# Patient Record
Sex: FEMALE | Race: WHITE | Employment: OTHER | URBAN - METROPOLITAN AREA
[De-identification: names, ages, dates, MRNs, and addresses within clinical notes are randomized per-mention and may not be internally consistent; named-entity substitution may affect disease eponyms.]

---

## 2020-05-13 ENCOUNTER — VIRTUAL VISIT (OUTPATIENT)
Dept: HEMATOLOGY | Age: 60
End: 2020-05-13

## 2020-05-13 DIAGNOSIS — B18.2 CHRONIC HEPATITIS C WITHOUT HEPATIC COMA (HCC): Primary | ICD-10-CM

## 2020-05-13 PROBLEM — C85.90 LYMPHOMA (HCC): Status: ACTIVE | Noted: 2020-05-13

## 2020-05-13 PROBLEM — J45.909 ASTHMA: Status: ACTIVE | Noted: 2020-05-13

## 2020-05-13 PROBLEM — E89.0 H/O THYROIDECTOMY: Status: ACTIVE | Noted: 2020-05-13

## 2020-05-13 PROBLEM — Z90.710 S/P TAH (TOTAL ABDOMINAL HYSTERECTOMY): Status: ACTIVE | Noted: 2020-05-13

## 2020-05-13 PROBLEM — Z90.49 HISTORY OF APPENDECTOMY: Status: ACTIVE | Noted: 2020-05-13

## 2020-05-13 PROBLEM — E03.9 HYPOTHYROIDISM: Status: ACTIVE | Noted: 2020-05-13

## 2020-05-13 PROBLEM — G35 MULTIPLE SCLEROSIS (HCC): Status: ACTIVE | Noted: 2020-05-13

## 2020-05-13 PROBLEM — I10 HYPERTENSION: Status: ACTIVE | Noted: 2020-05-13

## 2020-05-13 PROBLEM — E05.90 HYPERTHYROIDISM: Status: ACTIVE | Noted: 2020-05-13

## 2020-05-13 PROBLEM — E78.00 HYPERCHOLESTEROLEMIA: Status: ACTIVE | Noted: 2020-05-13

## 2020-05-13 NOTE — PROGRESS NOTES
[address] [place] [address], Bon EDWARD, [address] , RAMSES Waters ACNP-BC     April S Ludy [facility]   Ruthanna Opitz, FNP-C [place] Late, [facility]       Александр P[address] 136    at [address], [address] [place] [address], [facility] 22.    [phone]    FAX: [address] [place] [address]    at [address], [address]    [phone]    FAX: [phone]       Patient Care Team:  Other, MD Barron as PCP - Farhat Anthony MD (Hematology and Oncology)  AZIZA Petersen      Problem List  Date Reviewed: 5/13/2020          Codes Class Noted    Chronic hepatitis C ([facility] ICD-10-CM: B18.2  ICD-9-CM: 070.54  5/13/2020        Lymphoma ([facility] ICD-10-CM: C85.90  ICD-9-CM: 202.80  5/13/2020        Asthma ICD-10-CM: J45.909  ICD-9-CM: 493.90  5/13/2020        Hyperthyroidism ICD-10-CM: E05.90  ICD-9-CM: 242.90  5/13/2020        H/O thyroidectomy ICD-10-CM: Z90.09  ICD-9-CM: V15.29  5/13/2020        Hypothyroidism ICD-10-CM: E03.9  ICD-9-CM: 244.9  5/13/2020        Hypertension ICD-10-CM: I10  ICD-9-CM: 401.9  5/13/2020        Hypercholesterolemia ICD-10-CM: E78.00  ICD-9-CM: 272.0  5/13/2020        History of appendectomy ICD-10-CM: Z90.49  ICD-9-CM: V45.89  5/13/2020        S/P SHAY (total abdominal hysterectomy) ICD-10-CM: Z90.710  ICD-9-CM: V88.01  5/13/2020        Multiple sclerosis ([facility] ICD-10-CM: G35  ICD-9-CM: 799  5/13/2020              VIRTUAL TELEHEALTH VISIT PERFORMED DUE TO COVID-19 EPIDEMIC    CONSENT:  Varghese Malloy, who was seen by synchronous, real-time, audio-video technology, and/or her healthcare decision maker, is aware that this patient-initiated, Telehealth encounter on 5/13/2020 is a billable service, with coverage as determined by her insurance carrier. She is aware that she may receive a bill and has provided verbal consent to proceed. This patient was evaluated during a Virtual Telehealth visit. A caregiver was present if appropriate. Due to this being a TeleHealth encounter performed during the XQCGQ-49 public health emergency, the physical examination was limited to that listed in the [address] [facility].    The clinicians listed above have asked me to see Isa Dukes in consultation regarding chronic HCV and its management. All medical records sent by the referring physicians were reviewed including imaging studies     The patient is a 64 y.o.  female who was screened for anti-HCV and tested positive during blood donation in 1998. Risk factors for acquiring HCV are blood transfusions in 1985. There was no history of acute icteric hepatitis at the time of these risk factors. CT scan of the liver was performed in 4/2020. The results suggest the liver is normal with a 2.4 cm liver cyst.      An assessment of liver fibrosis with biopsy or elastography has not been performed. The patient was treated with peginterferon and ribavirin in the early 2000s. The patient tolerated treatment poorly and had to prematurely discontinue therapy due to neutropenia  HCV RNA levels obtained during treatment are not available at this time. The patient has the following symptoms which are thought to be due to the liver disease:  fatigue, nausea,     The patient is not currently experiencing the following symptoms of liver disease:  pain in the right side over the liver,     The patient has limitations in functional activities which can be attributed to the liver disease and to other medical problems that are not related to the liver disease. ASSESSMENT AND PLAN:  Chronic HCV  Chronic HCV is of unclear severity. Liver transaminases are elevated.   ALP is normal.  Liver function is normal.  The platelet count is normal.      Will perform laboratory testing to monitor liver function and degree of liver injury. This included BMP, hepatic panel, CBC with platelet count,     Will perform and/or review results of HCV viral load, HCV genotype to define the specific treatment and duration of treatment that will be required. Will perform serologic and virologic studies to assess for other causes of chronic liver disease. The degree of fibrosis will be assessed by Fibrosure. Chronic HCV Treatment  The patient was previously treated for HCV with peginterferon and ribavirin   The previous treatment response was a non-response. The patient has HCV genotype that is not yet defined. Discussed the treatment alternatives. The SVR/cure rate for HCV now exceeds 97% without significant side effects for most patients with HCV. The specific treatment is dependent upon genotype, viral load and histology. Screening for Hepatocellular Carcinoma  HCC screening is not necessary if the patient has no evidence of cirrhosis. Treatment of other medical problems in patients with chronic liver disease  There are no contraindications for the patient to take most medications that are necessary for treatment of other medical issues. Counseling for alcohol in patients with chronic liver disease  The patient was counseled regarding alcohol consumption and the effect of alcohol on chronic liver disease. The patient does not consume any significant amount of alcohol. Vaccinations   Vaccination for viral hepatitis A and B is not needed. The patient has serologic evidence of prior exposure or vaccination with immunity. Routine vaccinations against other bacterial and viral agents can be performed as indicated. Annual flu vaccination should be administered if indicated.       ALLERGIES  Allergies   Allergen Reactions    Acetaminophen Other (comments)     Hep c         MEDICATIONS  Current Outpatient Medications   Medication Sig    ondansetron hcl (ZOFRAN, AS HYDROCHLORIDE,) 4 mg tablet Take 1 Tab by mouth every eight (8) hours as needed for Nausea.  oxyCODONE IR (ROXICODONE) 5 mg immediate release tablet Take 1 Tab by mouth every four (4) hours as needed for Pain. Max Daily Amount: 30 mg. Please do not drive while taking this medication. No current facility-administered medications for this visit. SYSTEM REVIEW NOT RELATED TO LIVER DISEASE OR REVIEWED ABOVE:  Constitution systems: Negative for fever, chills, weight gain, weight loss. Eyes: Negative for visual changes. ENT: Negative for sore throat, painful swallowing. Respiratory: Negative for cough, hemoptysis, SOB. Cardiology: Negative for chest pain, palpitations. GI:  Negative for constipation or diarrhea. : Negative for urinary frequency, dysuria, hematuria, nocturia. Skin: Negative for rash. Hematology: Negative for easy bruising, blood clots. Musculo-skelatal: Negative for back pain, muscle pain, weakness. Neurologic: Negative for headaches, dizziness, vertigo, memory problems not related to HE. Psychology: Negative for anxiety, depression. FAMILY HISTORY:  The father  of lung cancer. The mother  of heart diseasse. There is no family history of liver disease. SOCIAL HISTORY:  The patient is . The patient has 2 children, and 7 grandchildren. The patient currently smokes 4-5 cigarettes daily. The patient has never consumed significant amounts of alcohol. 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 Eyes: Sclera anicteric. ENT: No oral lesions. Skin: No rashes. spider angiomata. No jaundice. Abdomen: No obvious distention suggesting ascites. Extremities: No edema. No muscle wasting. Neurologic: Alert and oriented.   Cranial nerves grossly intact. LABORATORY STUDIES:  From 1/2020  AST/ALT/ALP/T Bili/ALB:  55/63/110/0.3/4.6  WBC/HB/PLT/INR:  5.8/14.0/236  NA/BUN/CREAT:  15/1.3    SEROLOGIES:  1/2020. HAV total positive, anti-HBcore negative, anti-HBsurface positive, anti-HCV positive,     LIVER HISTOLOGY:  Not available or performed    ENDOSCOPIC PROCEDURES:  Not available or performed    RADIOLOGY:  4/2020. CT scan abdomen with IV contrast.  Normal appearing liver. No liver mass lesions. 2.4 cm cyst in left lobe. Normal spleen. No ascites. OTHER TESTING:  Not available or performed    FOLLOW-UP:  Pursuant to the emergency declaration under the [address] [place] [address], [facility] waiver authority and the [employer] Act, this Virtual  Visit was conducted, with the patient's (and/or their legal guardian's) consent, to reduce the patient's risk of exposure to COVID-19 and provide necessary medical care. Services were provided through a video synchronous discussion virtually to substitute for an in-person clinic visit. The patient was located in their home. The provider was located in the [place] [address] office. Because of the COVID-19 epidemic a follow-up appointment will be performed via TeleHealth in 4 weeks to review all data and initiate HCV treatment. Orders to obtain laboratory testing will be mailed to the patient and obtained 1 week prior to the next TeleHealth encounter.       Chase Pena MD  [address] [address], [address] [place] [address] - [address]  [address] [place] [address]

## 2020-05-28 ENCOUNTER — HOSPITAL ENCOUNTER (OUTPATIENT)
Dept: LAB | Age: 60
Discharge: HOME | End: 2020-05-28
Payer: MEDICAID

## 2020-05-28 DIAGNOSIS — B18.2 CHRONIC HEPATITIS C WITHOUT HEPATIC COMA (HCC): ICD-10-CM

## 2020-05-28 LAB
ALBUMIN SERPL-MCNC: 3.2 G/DL (ref 3.4–5)
ALBUMIN/GLOB SERPL: 1 {RATIO} (ref 0.8–1.7)
ALP SERPL-CCNC: 129 U/L (ref 45–117)
ALT SERPL-CCNC: 55 U/L (ref 13–56)
ANION GAP SERPL CALC-SCNC: 7 MMOL/L (ref 3–18)
AST SERPL-CCNC: 56 U/L (ref 10–38)
BASOPHILS # BLD: 0.1 K/UL (ref 0–0.1)
BASOPHILS NFR BLD: 2 % (ref 0–2)
BILIRUB DIRECT SERPL-MCNC: 0.2 MG/DL (ref 0–0.2)
BILIRUB SERPL-MCNC: 0.4 MG/DL (ref 0.2–1)
BUN SERPL-MCNC: 16 MG/DL (ref 7–18)
BUN/CREAT SERPL: 19 (ref 12–20)
CALCIUM SERPL-MCNC: 9.1 MG/DL (ref 8.5–10.1)
CHLORIDE SERPL-SCNC: 106 MMOL/L (ref 100–111)
CO2 SERPL-SCNC: 26 MMOL/L (ref 21–32)
CREAT SERPL-MCNC: 0.85 MG/DL (ref 0.6–1.3)
DIFFERENTIAL METHOD BLD: ABNORMAL
EOSINOPHIL # BLD: 0.2 K/UL (ref 0–0.4)
EOSINOPHIL NFR BLD: 4 % (ref 0–5)
ERYTHROCYTE [DISTWIDTH] IN BLOOD BY AUTOMATED COUNT: 16.2 % (ref 11.6–14.5)
GLOBULIN SER CALC-MCNC: 3.3 G/DL (ref 2–4)
GLUCOSE SERPL-MCNC: 108 MG/DL (ref 74–99)
HCT VFR BLD AUTO: 43 % (ref 35–45)
HGB BLD-MCNC: 13.3 G/DL (ref 12–16)
LYMPHOCYTES # BLD: 2 K/UL (ref 0.9–3.6)
LYMPHOCYTES NFR BLD: 30 % (ref 21–52)
MCH RBC QN AUTO: 24.4 PG (ref 24–34)
MCHC RBC AUTO-ENTMCNC: 30.9 G/DL (ref 31–37)
MCV RBC AUTO: 78.9 FL (ref 74–97)
MONOCYTES # BLD: 0.6 K/UL (ref 0.05–1.2)
MONOCYTES NFR BLD: 9 % (ref 3–10)
NEUTS SEG # BLD: 3.6 K/UL (ref 1.8–8)
NEUTS SEG NFR BLD: 55 % (ref 40–73)
PLATELET # BLD AUTO: 283 K/UL (ref 135–420)
PMV BLD AUTO: 9.6 FL (ref 9.2–11.8)
POTASSIUM SERPL-SCNC: 4.4 MMOL/L (ref 3.5–5.5)
PROT SERPL-MCNC: 6.5 G/DL (ref 6.4–8.2)
RBC # BLD AUTO: 5.45 M/UL (ref 4.2–5.3)
SODIUM SERPL-SCNC: 139 MMOL/L (ref 136–145)
WBC # BLD AUTO: 6.5 K/UL (ref 4.6–13.2)

## 2020-05-28 PROCEDURE — 87340 HEPATITIS B SURFACE AG IA: CPT

## 2020-05-28 PROCEDURE — 86706 HEP B SURFACE ANTIBODY: CPT

## 2020-05-28 PROCEDURE — 86708 HEPATITIS A ANTIBODY: CPT

## 2020-05-28 PROCEDURE — 87389 HIV-1 AG W/HIV-1&-2 AB AG IA: CPT

## 2020-05-28 PROCEDURE — 80076 HEPATIC FUNCTION PANEL: CPT

## 2020-05-28 PROCEDURE — 86704 HEP B CORE ANTIBODY TOTAL: CPT

## 2020-05-28 PROCEDURE — 87521 HEPATITIS C PROBE&RVRS TRNSC: CPT

## 2020-05-28 PROCEDURE — 36415 COLL VENOUS BLD VENIPUNCTURE: CPT

## 2020-05-28 PROCEDURE — 85025 COMPLETE CBC W/AUTO DIFF WBC: CPT

## 2020-05-28 PROCEDURE — 81596 NFCT DS CHRNC HCV 6 ASSAYS: CPT

## 2020-05-28 PROCEDURE — 87902 NFCT AGT GNTYP ALYS HEP C: CPT

## 2020-05-28 PROCEDURE — 80048 BASIC METABOLIC PNL TOTAL CA: CPT

## 2020-05-29 LAB
HAV AB SER QL IA: POSITIVE
HBV CORE AB SERPL QL IA: NEGATIVE
HBV SURFACE AB SER QL IA: POSITIVE
HBV SURFACE AB SERPL IA-ACNC: 98.42 MIU/ML
HBV SURFACE AG SER QL: <0.1 INDEX
HBV SURFACE AG SER QL: NEGATIVE
HCV RNA SERPL NAA+PROBE-LOG IU: 6.85 HCV LOG 10IU/ML
HCV RNA SERPL PROBE AMP-ACNC: ABNORMAL HCVIU/ML
HCV RNA SERPL QL NAA+PROBE: POSITIVE
HEP BS AB COMMENT,HBSAC: NORMAL
HIV 1+2 AB+HIV1 P24 AG SERPL QL IA: NONREACTIVE
HIV12 RESULT COMMENT, HHIVC: NORMAL

## 2020-05-30 LAB
A2 MACROGLOB SERPL-MCNC: 327 MG/DL (ref 110–276)
ALT (SGPT) P5P, 001547: 49 IU/L (ref 0–40)
APO A-I SERPL-MCNC: 106 MG/DL (ref 116–209)
BILIRUB SERPL-MCNC: 0.3 MG/DL (ref 0–1.2)
COMMENT, 550127: ABNORMAL
FIBROSIS SCORE, 550102, HCVF1: 0.45 (ref 0–0.21)
FIBROSIS SCORING:, 550107: ABNORMAL
FIBROSIS STAGE, 550132: ABNORMAL
GGT SERPL-CCNC: 55 IU/L (ref 0–60)
HAPTOGLOB SERPL-MCNC: 181 MG/DL (ref 33–346)
INTERPRETATION, 550106: ABNORMAL
LIMITATIONS, 550105: ABNORMAL
NECROINFLAMM ACTIVITY SCORING:, 550121: ABNORMAL
NECROINFLAMMAT ACTIVITY GRADE, 550133: ABNORMAL
NECROINFLAMMAT ACTIVITY SCORE, 550103: 0.33 (ref 0–0.17)

## 2020-06-03 LAB
HCV GENTYP SERPL NAA+PROBE: NORMAL
PLEASE NOTE, 550474: NORMAL

## 2020-08-05 ENCOUNTER — VIRTUAL VISIT (OUTPATIENT)
Dept: HEMATOLOGY | Age: 60
End: 2020-08-05

## 2020-08-05 DIAGNOSIS — B18.2 CHRONIC HEPATITIS C WITHOUT HEPATIC COMA (HCC): ICD-10-CM

## 2020-08-05 PROBLEM — C85.88 MARGINAL ZONE LYMPHOMA OF LYMPH NODES OF MULTIPLE SITES (HCC): Status: ACTIVE | Noted: 2020-05-13

## 2020-08-05 RX ORDER — PROMETHAZINE HYDROCHLORIDE 25 MG/1
25 TABLET ORAL
COMMUNITY

## 2020-08-05 RX ORDER — HYDROXYZINE 50 MG/1
50 TABLET, FILM COATED ORAL
COMMUNITY

## 2020-08-05 RX ORDER — VELPATASVIR AND SOFOSBUVIR 100; 400 MG/1; MG/1
1 TABLET, FILM COATED ORAL DAILY
Qty: 28 TAB | Refills: 2 | Status: SHIPPED | OUTPATIENT
Start: 2020-08-05

## 2020-08-05 RX ORDER — LORAZEPAM 0.5 MG/1
TABLET ORAL
COMMUNITY

## 2020-08-05 RX ORDER — PREGABALIN 75 MG/1
CAPSULE ORAL
COMMUNITY

## 2020-08-05 RX ORDER — ZOLPIDEM TARTRATE 5 MG/1
TABLET ORAL
COMMUNITY

## 2020-08-05 RX ORDER — LEVOTHYROXINE SODIUM 112 UG/1
TABLET ORAL
COMMUNITY

## 2020-08-05 RX ORDER — OLANZAPINE 2.5 MG/1
TABLET ORAL
COMMUNITY

## 2020-08-05 NOTE — PROGRESS NOTES
[address] [place] [address], Rashmi EDWARD Matilde Ranger, MD Darron Bayley, PA-C Elvie Packer, ACNP-BC     Heather Boston, [facility]   Satish Madrigal FNP-ASHUTOSH Esquivel, [facility]       Александр Deputado Trever [address]    at [address], [address] [place] [address], [facility] 22.    [phone]    FAX: [address] [place] [address]    at [address], [address], [address]    [phone]    FAX: [phone]       Patient Care Team:  Fito Velasquez MD as PCP - General (Family Medicine)  Annita Diaz MD (Hematology and Oncology)  [facility]      Problem List  Date Reviewed: 8/5/2020          Codes Class Noted    Chronic hepatitis C (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) ICD-10-CM: C85.88  ICD-9-CM: 200.38  5/13/2020        Asthma ICD-10-CM: J45.909  ICD-9-CM: 493.90  5/13/2020        Hyperthyroidism ICD-10-CM: E05.90  ICD-9-CM: 242.90  5/13/2020        H/O thyroidectomy ICD-10-CM: Z90.09  ICD-9-CM: V15.29  5/13/2020        Hypothyroidism ICD-10-CM: E03.9  ICD-9-CM: 244.9  5/13/2020        Hypertension ICD-10-CM: I10  ICD-9-CM: 401.9  5/13/2020        Hypercholesterolemia ICD-10-CM: E78.00  ICD-9-CM: 272.0  5/13/2020        History of appendectomy ICD-10-CM: Z90.49  ICD-9-CM: V45.89  5/13/2020        S/P SHAY (total abdominal hysterectomy) ICD-10-CM: Z90.710  ICD-9-CM: V88.01  5/13/2020        Multiple sclerosis ([facility] ICD-10-CM: G35  ICD-9-CM: 960  5/13/2020              VIRTUAL TELEHEALTH VISIT PERFORMED DUE TO COVID-19 EPIDEMIC    CONSENT:  Teodoro Mcintosh, who was seen by synchronous, real-time, audio-video technology, and/or her healthcare decision maker, is aware that this patient-initiated, Telehealth encounter on 8/5/2020 is a billable service, with coverage as determined by her insurance carrier. She is aware that she may receive a bill and has provided verbal consent to proceed. This patient was evaluated during a Virtual Telehealth visit. A caregiver was present if appropriate. Due to this being a TeleHealth encounter performed during the VTWPK-96 public health emergency, the physical examination was limited to that listed in the Katmiguel ajory Jarrett is being seen at [facility] for management of chronic HCV. The active problem list, all pertinent past medical history, medications, liver histology, radiologic findings, and laboratory findings related to the liver disorder were reviewed with the patient. The patient is a 64 y.o.  female who was screened for anti-HCV and tested positive during blood donation in 1998. Risk factors for acquiring HCV are blood transfusions in 1985. There was no history of acute icteric hepatitis at the time of these risk factors. CT scan of the liver was performed in 4/2020. The results suggest the liver is normal with a 2.4 cm liver cyst.      An assessment of liver fibrosis with biopsy or elastography has not been performed. The patient was treated with peginterferon and ribavirin in the early 2000s. The patient tolerated treatment poorly and had to prematurely discontinue therapy due to neutropenia  HCV RNA levels obtained during treatment are not available at this time.       The patient has the following symptoms which are thought to be due to the liver disease:  fatigue, nausea    The patient is not currently experiencing the following symptoms of liver disease:  pain in the right side over the liver    The patient has limitations in functional activities which can be attributed to the liver disease and to other medical problems that are not related to the liver disease. ASSESSMENT AND PLAN:  Chronic HCV  Chronic HCV with F1-F2 per Fibrosure. AST is elevated. ALT is normal. ALP is elevated. Liver function is normal. The platelet count is normal.      Have performed laboratory testing to monitor liver function and degree of liver injury. This included BMP, hepatic panel, CBC with platelet count,     Have reviewed results of HCV viral load, HCV genotype to define the specific treatment and duration of treatment that will be required. Serologic and virologic studies to assess for other causes of chronic liver disease were negative. Fibrosure results of 0.45 suggest F1-F2. Chronic HCV Treatment  The patient was previously treated for HCV with peginterferon and ribavirin   The previous treatment response was a non-response. The patient has HCV genotype 1A    Discussed the treatment alternatives. The SVR/cure rate for HCV now exceeds 97% without significant side effects for most patients with HCV. The specific treatment is dependent upon genotype, viral load and histology. The patient should be treated with Mavyret (glecaprevir and piprentasvir) or Epclusa (sofosbuvir and valpitasvir). The SVR/cure rate for is over 95%. We will request pre-authorization for the HCV medication subject to the approval guidelines of the patient's insurance carrier. If the patient is denied we may appeal on their behalf. I have explained to her that I will order the medications from the specialty pharmacy and they will be delivered to her home. She may begin taking the treatment medications as soon as they arrive. She is to call and make an appointment for treatment week #4 once she begins therapy. She voiced understanding of this plan. Screening for Hepatocellular Carcinoma  HCC screening is not necessary if the patient has no evidence of cirrhosis.     Treatment of other medical problems in patients with chronic liver disease  There are no contraindications for the patient to take most medications that are necessary for treatment of other medical issues. Counseling for alcohol in patients with chronic liver disease  The patient was counseled regarding alcohol consumption and the effect of alcohol on chronic liver disease. The patient does not consume any significant amount of alcohol. Vaccinations   Vaccination for viral hepatitis A and B is not needed. The patient has serologic evidence of prior exposure or vaccination with immunity. Routine vaccinations against other bacterial and viral agents can be performed as indicated. Annual flu vaccination should be administered if indicated. ALLERGIES  Allergies   Allergen Reactions    Acetaminophen Other (comments)     Hep c         MEDICATIONS  Current Outpatient Medications   Medication Sig    promethazine (PHENERGAN) 25 mg tablet Take 25 mg by mouth every six (6) hours as needed.  levothyroxine (SYNTHROID) 112 mcg tablet Take  by mouth Daily (before breakfast).  OLANZapine (ZyPREXA) 2.5 mg tablet Take  by mouth nightly.  hydrOXYzine HCL (ATARAX) 50 mg tablet Take 50 mg by mouth three (3) times daily as needed.  pregabalin (LYRICA) 75 mg capsule Take  by mouth.  LORazepam (ATIVAN) 0.5 mg tablet Take  by mouth.  zolpidem (AMBIEN) 5 mg tablet Take  by mouth nightly as needed.  riTUXimab in 0.9% sodium chloride solution 375 mg/m2 by IntraVENous route.  ondansetron hcl (ZOFRAN, AS HYDROCHLORIDE,) 4 mg tablet Take 1 Tab by mouth every eight (8) hours as needed for Nausea. No current facility-administered medications for this visit. SYSTEM REVIEW NOT RELATED TO LIVER DISEASE OR REVIEWED ABOVE:  Constitution systems: Negative for fever, chills, weight gain, weight loss. Eyes: Negative for visual changes. ENT: Negative for sore throat, painful swallowing. Respiratory: Negative for cough, hemoptysis, SOB.    Cardiology: Negative for chest pain, palpitations. GI:  Negative for constipation or diarrhea. : Negative for urinary frequency, dysuria, hematuria, nocturia. Skin: Negative for rash. Hematology: Negative for easy bruising, blood clots. Musculo-skelatal: Negative for back pain, muscle pain, weakness. Neurologic: Negative for headaches, dizziness, vertigo, memory problems not related to HE. Psychology: Negative for anxiety, depression. FAMILY HISTORY:  The father  of lung cancer. The mother  of heart diseasse. There is no family history of liver disease. SOCIAL HISTORY:  The patient is . The patient has 2 children, and 7 grandchildren. The patient currently smokes 4-5 cigarettes daily. The patient has never consumed significant amounts of alcohol. 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 Eyes: Sclera anicteric. ENT: No oral lesions. Skin: No rashes. spider angiomata. No jaundice. Abdomen: No obvious distention suggesting ascites. Extremities: No edema. No muscle wasting. Neurologic: Alert and oriented. Cranial nerves grossly intact. LABORATORY STUDIES:  Liver [place] of [address] Units 2020   WBC 4.6 - 13.2 K/uL 6.5   ANC 1.8 - 8.0 K/UL 3.6   HGB 12.0 - 16.0 g/dL 13.3    - 420 K/uL 283   AST 10 - 38 U/L 56 (H)   ALT 0 - 40 IU/L 55   Alk Phos 45 - 117 U/L 129 (H)   Bili, Total 0.0 - 1.2 mg/dL 0.4   Bili, Direct 0.0 - 0.2 MG/DL 0.2   Albumin 3.4 - 5.0 g/dL 3.2 (L)   BUN 7.0 - 18 MG/DL 16   Creat 0.6 - 1.3 MG/DL 0.85   Na 136 - 145 mmol/L 139   K 3.5 - 5.5 mmol/L 4.4   Cl 100 - 111 mmol/L 106   CO2 21 - 32 mmol/L 26   Glucose 74 - 99 mg/dL 108 (H)       SEROLOGIES:  2020.   HAV total positive, anti-HBcore negative, anti-HBsurface positive, anti-HCV positive    Serologies Latest Ref Rng & Units 2020   Hep A Ab, Total Negative   Positive (A)   Hep B Surface Ag <1.00 Index <0.10   Hep B Surface Ag Interp NEG   Negative   Hep B Core Ab, Total Negative   Negative   Hep B Surface Ab >10.0 mIU/mL 98.42   Hep B Surface Ab Interp POS   Positive   Hep C Genotype  1a     HCV RNA:  5/2020. 7,002,091 IU/mL    LIVER HISTOLOGY:  5/2020. Fibrosure. ENDOSCOPIC PROCEDURES:  Not available or performed    RADIOLOGY:  4/2020. CT scan abdomen with IV contrast.  Normal appearing liver. No liver mass lesions. 2.4 cm cyst in left lobe. Normal spleen. No ascites. OTHER TESTING:  Not available or performed    FOLLOW-UP:  All of the issues listed above in the Assessment and Plan were discussed with the patient. All questions were answered. The patient expressed a clear understanding of the above.     Return to [place] [address] for monitoring of HCV treatment in 4 weeks after starting medication      Hailey Martin, AGPCNP-BC  [address] of [address], [address]  [phone]

## 2022-03-18 PROBLEM — E78.00 HYPERCHOLESTEROLEMIA: Status: ACTIVE | Noted: 2020-05-13

## 2022-03-18 PROBLEM — E05.90 HYPERTHYROIDISM: Status: ACTIVE | Noted: 2020-05-13

## 2022-03-18 PROBLEM — C85.88 MARGINAL ZONE LYMPHOMA OF LYMPH NODES OF MULTIPLE SITES (HCC): Status: ACTIVE | Noted: 2020-05-13

## 2022-03-18 PROBLEM — E03.9 HYPOTHYROIDISM: Status: ACTIVE | Noted: 2020-05-13

## 2022-03-18 PROBLEM — I10 HYPERTENSION: Status: ACTIVE | Noted: 2020-05-13

## 2022-03-19 PROBLEM — Z90.710 S/P TAH (TOTAL ABDOMINAL HYSTERECTOMY): Status: ACTIVE | Noted: 2020-05-13

## 2022-03-19 PROBLEM — B18.2 CHRONIC HEPATITIS C (HCC): Status: ACTIVE | Noted: 2020-05-13

## 2022-03-19 PROBLEM — E89.0 H/O THYROIDECTOMY: Status: ACTIVE | Noted: 2020-05-13

## 2022-03-19 PROBLEM — J45.909 ASTHMA: Status: ACTIVE | Noted: 2020-05-13

## 2022-03-19 PROBLEM — G35 MULTIPLE SCLEROSIS (HCC): Status: ACTIVE | Noted: 2020-05-13

## 2022-03-19 PROBLEM — Z90.49 HISTORY OF APPENDECTOMY: Status: ACTIVE | Noted: 2020-05-13

## 2024-05-18 ENCOUNTER — HOSPITAL ENCOUNTER (INPATIENT)
Facility: HOSPITAL | Age: 64
LOS: 2 days | Discharge: HOME | DRG: 383 | End: 2024-05-24
Attending: INTERNAL MEDICINE | Admitting: INTERNAL MEDICINE
Payer: MEDICAID

## 2024-05-18 ENCOUNTER — APPOINTMENT (OUTPATIENT)
Facility: HOSPITAL | Age: 64
DRG: 383 | End: 2024-05-18
Payer: MEDICAID

## 2024-05-18 DIAGNOSIS — Z87.09 HISTORY OF COPD: ICD-10-CM

## 2024-05-18 DIAGNOSIS — I24.9 ACUTE CORONARY SYNDROME (HCC): ICD-10-CM

## 2024-05-18 DIAGNOSIS — R07.9 CHEST PAIN, UNSPECIFIED TYPE: Primary | ICD-10-CM

## 2024-05-18 DIAGNOSIS — I20.9 ANGINA PECTORIS (HCC): ICD-10-CM

## 2024-05-18 DIAGNOSIS — Z99.81 O2 DEPENDENT: ICD-10-CM

## 2024-05-18 DIAGNOSIS — R07.9 CHEST PAIN: ICD-10-CM

## 2024-05-18 PROBLEM — J96.11 CHRONIC HYPOXIC RESPIRATORY FAILURE (HCC): Status: ACTIVE | Noted: 2024-05-18

## 2024-05-18 PROBLEM — R07.89 ATYPICAL CHEST PAIN: Status: ACTIVE | Noted: 2024-05-18

## 2024-05-18 PROBLEM — F41.9 ANXIETY: Status: ACTIVE | Noted: 2024-05-18

## 2024-05-18 PROBLEM — E11.65 INADEQUATELY CONTROLLED DIABETES MELLITUS (HCC): Status: ACTIVE | Noted: 2024-05-18

## 2024-05-18 PROBLEM — J43.2 CENTRILOBULAR EMPHYSEMA (HCC): Status: ACTIVE | Noted: 2024-05-18

## 2024-05-18 LAB
ALBUMIN SERPL-MCNC: 3.4 G/DL (ref 3.4–5)
ALBUMIN/GLOB SERPL: 1.2 (ref 0.8–1.7)
ALP SERPL-CCNC: 144 U/L (ref 45–117)
ALT SERPL-CCNC: 55 U/L (ref 13–56)
ANION GAP SERPL CALC-SCNC: 7 MMOL/L (ref 3–18)
APPEARANCE UR: CLEAR
AST SERPL-CCNC: 68 U/L (ref 10–38)
BASOPHILS # BLD: 0.1 K/UL (ref 0–0.1)
BASOPHILS NFR BLD: 2 % (ref 0–2)
BILIRUB SERPL-MCNC: 0.4 MG/DL (ref 0.2–1)
BILIRUB UR QL: NEGATIVE
BUN SERPL-MCNC: 6 MG/DL (ref 7–18)
BUN/CREAT SERPL: 7 (ref 12–20)
CALCIUM SERPL-MCNC: 9 MG/DL (ref 8.5–10.1)
CHLORIDE SERPL-SCNC: 109 MMOL/L (ref 100–111)
CO2 SERPL-SCNC: 25 MMOL/L (ref 21–32)
COLOR UR: YELLOW
CREAT SERPL-MCNC: 0.84 MG/DL (ref 0.6–1.3)
DIFFERENTIAL METHOD BLD: ABNORMAL
EOSINOPHIL # BLD: 0.2 K/UL (ref 0–0.4)
EOSINOPHIL NFR BLD: 3 % (ref 0–5)
ERYTHROCYTE [DISTWIDTH] IN BLOOD BY AUTOMATED COUNT: 18.6 % (ref 11.6–14.5)
EST. AVERAGE GLUCOSE BLD GHB EST-MCNC: 146 MG/DL
FLUAV RNA SPEC QL NAA+PROBE: NOT DETECTED
FLUBV RNA SPEC QL NAA+PROBE: NOT DETECTED
GLOBULIN SER CALC-MCNC: 2.9 G/DL (ref 2–4)
GLUCOSE BLD STRIP.AUTO-MCNC: 175 MG/DL (ref 74–99)
GLUCOSE BLD STRIP.AUTO-MCNC: 235 MG/DL (ref 70–110)
GLUCOSE SERPL-MCNC: 179 MG/DL (ref 74–99)
GLUCOSE UR STRIP.AUTO-MCNC: NEGATIVE MG/DL
HBA1C MFR BLD: 6.7 % (ref 4.2–5.6)
HCT VFR BLD AUTO: 36.6 % (ref 35–45)
HGB BLD-MCNC: 11.2 G/DL (ref 12–16)
HGB UR QL STRIP: NEGATIVE
IMM GRANULOCYTES # BLD AUTO: 0.1 K/UL (ref 0–0.04)
IMM GRANULOCYTES NFR BLD AUTO: 1 % (ref 0–0.5)
KETONES UR QL STRIP.AUTO: NEGATIVE MG/DL
LACTATE BLD-SCNC: 1.14 MMOL/L (ref 0.4–2)
LEUKOCYTE ESTERASE UR QL STRIP.AUTO: NEGATIVE
LIPASE SERPL-CCNC: 39 U/L (ref 13–75)
LYMPHOCYTES # BLD: 2.4 K/UL (ref 0.9–3.6)
LYMPHOCYTES NFR BLD: 33 % (ref 21–52)
MAGNESIUM SERPL-MCNC: 2.2 MG/DL (ref 1.6–2.6)
MCH RBC QN AUTO: 21.3 PG (ref 24–34)
MCHC RBC AUTO-ENTMCNC: 30.6 G/DL (ref 31–37)
MCV RBC AUTO: 69.6 FL (ref 78–100)
MONOCYTES # BLD: 0.6 K/UL (ref 0.05–1.2)
MONOCYTES NFR BLD: 9 % (ref 3–10)
NEUTS SEG # BLD: 3.8 K/UL (ref 1.8–8)
NEUTS SEG NFR BLD: 52 % (ref 40–73)
NITRITE UR QL STRIP.AUTO: NEGATIVE
NRBC # BLD: 0 K/UL (ref 0–0.01)
NRBC BLD-RTO: 0 PER 100 WBC
NT PRO BNP: 235 PG/ML (ref 0–900)
PH UR STRIP: 7 (ref 5–8)
PLATELET # BLD AUTO: 195 K/UL (ref 135–420)
PLATELET COMMENT: ABNORMAL
PMV BLD AUTO: 10.3 FL (ref 9.2–11.8)
POTASSIUM SERPL-SCNC: 3.7 MMOL/L (ref 3.5–5.5)
PROCALCITONIN SERPL-MCNC: 0.22 NG/ML
PROT SERPL-MCNC: 6.3 G/DL (ref 6.4–8.2)
PROT UR STRIP-MCNC: NEGATIVE MG/DL
RBC # BLD AUTO: 5.26 M/UL (ref 4.2–5.3)
RBC MORPH BLD: ABNORMAL
RBC MORPH BLD: ABNORMAL
SARS-COV-2 RNA RESP QL NAA+PROBE: NOT DETECTED
SERVICE CMNT-IMP: ABNORMAL
SODIUM SERPL-SCNC: 141 MMOL/L (ref 136–145)
SP GR UR REFRACTOMETRY: 1.02 (ref 1–1.03)
TROPONIN I SERPL HS-MCNC: 6 NG/L (ref 0–54)
TROPONIN I SERPL HS-MCNC: 6 NG/L (ref 0–54)
TSH SERPL DL<=0.05 MIU/L-ACNC: 0.47 UIU/ML (ref 0.36–3.74)
UROBILINOGEN UR QL STRIP.AUTO: 0.2 EU/DL (ref 0.2–1)
WBC # BLD AUTO: 7.2 K/UL (ref 4.6–13.2)

## 2024-05-18 PROCEDURE — 84484 ASSAY OF TROPONIN QUANT: CPT

## 2024-05-18 PROCEDURE — 82962 GLUCOSE BLOOD TEST: CPT

## 2024-05-18 PROCEDURE — 96375 TX/PRO/DX INJ NEW DRUG ADDON: CPT

## 2024-05-18 PROCEDURE — 99212 OFFICE O/P EST SF 10 MIN: CPT | Performed by: INTERNAL MEDICINE

## 2024-05-18 PROCEDURE — 93005 ELECTROCARDIOGRAM TRACING: CPT | Performed by: INTERNAL MEDICINE

## 2024-05-18 PROCEDURE — 71275 CT ANGIOGRAPHY CHEST: CPT

## 2024-05-18 PROCEDURE — G0378 HOSPITAL OBSERVATION PER HR: HCPCS

## 2024-05-18 PROCEDURE — 6360000004 HC RX CONTRAST MEDICATION: Performed by: PHYSICIAN ASSISTANT

## 2024-05-18 PROCEDURE — 84145 PROCALCITONIN (PCT): CPT

## 2024-05-18 PROCEDURE — 99285 EMERGENCY DEPT VISIT HI MDM: CPT

## 2024-05-18 PROCEDURE — 6370000000 HC RX 637 (ALT 250 FOR IP): Performed by: INTERNAL MEDICINE

## 2024-05-18 PROCEDURE — 83880 ASSAY OF NATRIURETIC PEPTIDE: CPT

## 2024-05-18 PROCEDURE — 6360000002 HC RX W HCPCS: Performed by: INTERNAL MEDICINE

## 2024-05-18 PROCEDURE — 83735 ASSAY OF MAGNESIUM: CPT

## 2024-05-18 PROCEDURE — 6360000002 HC RX W HCPCS: Performed by: PHYSICIAN ASSISTANT

## 2024-05-18 PROCEDURE — 96374 THER/PROPH/DIAG INJ IV PUSH: CPT

## 2024-05-18 PROCEDURE — 83690 ASSAY OF LIPASE: CPT

## 2024-05-18 PROCEDURE — A4216 STERILE WATER/SALINE, 10 ML: HCPCS | Performed by: PHYSICIAN ASSISTANT

## 2024-05-18 PROCEDURE — 2500000003 HC RX 250 WO HCPCS: Performed by: PHYSICIAN ASSISTANT

## 2024-05-18 PROCEDURE — 85025 COMPLETE CBC W/AUTO DIFF WBC: CPT

## 2024-05-18 PROCEDURE — 83036 HEMOGLOBIN GLYCOSYLATED A1C: CPT

## 2024-05-18 PROCEDURE — 93005 ELECTROCARDIOGRAM TRACING: CPT | Performed by: PHYSICIAN ASSISTANT

## 2024-05-18 PROCEDURE — 80053 COMPREHEN METABOLIC PANEL: CPT

## 2024-05-18 PROCEDURE — 71045 X-RAY EXAM CHEST 1 VIEW: CPT

## 2024-05-18 PROCEDURE — 83605 ASSAY OF LACTIC ACID: CPT

## 2024-05-18 PROCEDURE — 87040 BLOOD CULTURE FOR BACTERIA: CPT

## 2024-05-18 PROCEDURE — 96376 TX/PRO/DX INJ SAME DRUG ADON: CPT

## 2024-05-18 PROCEDURE — 96361 HYDRATE IV INFUSION ADD-ON: CPT

## 2024-05-18 PROCEDURE — 2580000003 HC RX 258: Performed by: INTERNAL MEDICINE

## 2024-05-18 PROCEDURE — 87636 SARSCOV2 & INF A&B AMP PRB: CPT

## 2024-05-18 PROCEDURE — 2580000003 HC RX 258: Performed by: PHYSICIAN ASSISTANT

## 2024-05-18 PROCEDURE — 84443 ASSAY THYROID STIM HORMONE: CPT

## 2024-05-18 PROCEDURE — 81003 URINALYSIS AUTO W/O SCOPE: CPT

## 2024-05-18 PROCEDURE — 6370000000 HC RX 637 (ALT 250 FOR IP): Performed by: PHYSICIAN ASSISTANT

## 2024-05-18 RX ORDER — MORPHINE SULFATE 4 MG/ML
4 INJECTION, SOLUTION INTRAMUSCULAR; INTRAVENOUS EVERY 4 HOURS PRN
Status: DISCONTINUED | OUTPATIENT
Start: 2024-05-18 | End: 2024-05-23

## 2024-05-18 RX ORDER — INSULIN LISPRO 100 [IU]/ML
0-4 INJECTION, SOLUTION INTRAVENOUS; SUBCUTANEOUS NIGHTLY
Status: DISCONTINUED | OUTPATIENT
Start: 2024-05-18 | End: 2024-05-24 | Stop reason: HOSPADM

## 2024-05-18 RX ORDER — SODIUM CHLORIDE, SODIUM LACTATE, POTASSIUM CHLORIDE, AND CALCIUM CHLORIDE .6; .31; .03; .02 G/100ML; G/100ML; G/100ML; G/100ML
30 INJECTION, SOLUTION INTRAVENOUS ONCE
Status: DISCONTINUED | OUTPATIENT
Start: 2024-05-18 | End: 2024-05-18

## 2024-05-18 RX ORDER — MAGNESIUM SULFATE IN WATER 40 MG/ML
2000 INJECTION, SOLUTION INTRAVENOUS PRN
Status: DISCONTINUED | OUTPATIENT
Start: 2024-05-18 | End: 2024-05-24 | Stop reason: HOSPADM

## 2024-05-18 RX ORDER — SODIUM CHLORIDE 0.9 % (FLUSH) 0.9 %
5-40 SYRINGE (ML) INJECTION EVERY 12 HOURS SCHEDULED
Status: DISCONTINUED | OUTPATIENT
Start: 2024-05-18 | End: 2024-05-24 | Stop reason: HOSPADM

## 2024-05-18 RX ORDER — ONDANSETRON 4 MG/1
4 TABLET, ORALLY DISINTEGRATING ORAL EVERY 8 HOURS PRN
Status: DISCONTINUED | OUTPATIENT
Start: 2024-05-18 | End: 2024-05-24 | Stop reason: HOSPADM

## 2024-05-18 RX ORDER — ENOXAPARIN SODIUM 100 MG/ML
40 INJECTION SUBCUTANEOUS DAILY
Status: DISCONTINUED | OUTPATIENT
Start: 2024-05-18 | End: 2024-05-24 | Stop reason: HOSPADM

## 2024-05-18 RX ORDER — HYDRALAZINE HYDROCHLORIDE 20 MG/ML
5 INJECTION INTRAMUSCULAR; INTRAVENOUS
Status: COMPLETED | OUTPATIENT
Start: 2024-05-18 | End: 2024-05-18

## 2024-05-18 RX ORDER — SODIUM CHLORIDE 0.9 % (FLUSH) 0.9 %
5-40 SYRINGE (ML) INJECTION PRN
Status: DISCONTINUED | OUTPATIENT
Start: 2024-05-18 | End: 2024-05-24 | Stop reason: HOSPADM

## 2024-05-18 RX ORDER — NITROGLYCERIN 0.4 MG/1
0.4 TABLET SUBLINGUAL EVERY 5 MIN PRN
Status: COMPLETED | OUTPATIENT
Start: 2024-05-18 | End: 2024-05-20

## 2024-05-18 RX ORDER — ZOLPIDEM TARTRATE 5 MG/1
5 TABLET ORAL NIGHTLY PRN
Status: DISCONTINUED | OUTPATIENT
Start: 2024-05-18 | End: 2024-05-24 | Stop reason: HOSPADM

## 2024-05-18 RX ORDER — TIOTROPIUM BROMIDE 18 UG/1
18 CAPSULE ORAL; RESPIRATORY (INHALATION) DAILY
COMMUNITY

## 2024-05-18 RX ORDER — SODIUM CHLORIDE 9 MG/ML
INJECTION, SOLUTION INTRAVENOUS PRN
Status: DISCONTINUED | OUTPATIENT
Start: 2024-05-18 | End: 2024-05-24 | Stop reason: HOSPADM

## 2024-05-18 RX ORDER — POTASSIUM CHLORIDE 7.45 MG/ML
10 INJECTION INTRAVENOUS PRN
Status: DISCONTINUED | OUTPATIENT
Start: 2024-05-18 | End: 2024-05-24 | Stop reason: HOSPADM

## 2024-05-18 RX ORDER — ONDANSETRON 2 MG/ML
4 INJECTION INTRAMUSCULAR; INTRAVENOUS
Status: COMPLETED | OUTPATIENT
Start: 2024-05-18 | End: 2024-05-18

## 2024-05-18 RX ORDER — ATORVASTATIN CALCIUM 20 MG/1
40 TABLET, FILM COATED ORAL NIGHTLY
Status: DISCONTINUED | OUTPATIENT
Start: 2024-05-18 | End: 2024-05-24 | Stop reason: HOSPADM

## 2024-05-18 RX ORDER — ASPIRIN 81 MG/1
81 TABLET, CHEWABLE ORAL DAILY
Status: DISCONTINUED | OUTPATIENT
Start: 2024-05-19 | End: 2024-05-24 | Stop reason: HOSPADM

## 2024-05-18 RX ORDER — MORPHINE SULFATE 4 MG/ML
4 INJECTION, SOLUTION INTRAMUSCULAR; INTRAVENOUS
Status: COMPLETED | OUTPATIENT
Start: 2024-05-18 | End: 2024-05-18

## 2024-05-18 RX ORDER — HYDROXYZINE HYDROCHLORIDE 25 MG/1
50 TABLET, FILM COATED ORAL 3 TIMES DAILY PRN
Status: DISCONTINUED | OUTPATIENT
Start: 2024-05-18 | End: 2024-05-24 | Stop reason: HOSPADM

## 2024-05-18 RX ORDER — INSULIN LISPRO 100 [IU]/ML
0-8 INJECTION, SOLUTION INTRAVENOUS; SUBCUTANEOUS
Status: DISCONTINUED | OUTPATIENT
Start: 2024-05-18 | End: 2024-05-24 | Stop reason: HOSPADM

## 2024-05-18 RX ORDER — LEVOTHYROXINE SODIUM 0.03 MG/1
112 TABLET ORAL
Status: DISCONTINUED | OUTPATIENT
Start: 2024-05-18 | End: 2024-05-19 | Stop reason: CLARIF

## 2024-05-18 RX ORDER — LORAZEPAM 2 MG/ML
1 INJECTION INTRAMUSCULAR ONCE
Status: COMPLETED | OUTPATIENT
Start: 2024-05-18 | End: 2024-05-18

## 2024-05-18 RX ORDER — SODIUM CHLORIDE, SODIUM LACTATE, POTASSIUM CHLORIDE, AND CALCIUM CHLORIDE .6; .31; .03; .02 G/100ML; G/100ML; G/100ML; G/100ML
1000 INJECTION, SOLUTION INTRAVENOUS ONCE
Status: COMPLETED | OUTPATIENT
Start: 2024-05-18 | End: 2024-05-18

## 2024-05-18 RX ORDER — IPRATROPIUM BROMIDE AND ALBUTEROL SULFATE 2.5; .5 MG/3ML; MG/3ML
1 SOLUTION RESPIRATORY (INHALATION)
Status: COMPLETED | OUTPATIENT
Start: 2024-05-18 | End: 2024-05-18

## 2024-05-18 RX ORDER — LOSARTAN POTASSIUM 25 MG/1
25 TABLET ORAL DAILY
Status: DISCONTINUED | OUTPATIENT
Start: 2024-05-18 | End: 2024-05-19

## 2024-05-18 RX ORDER — POLYETHYLENE GLYCOL 3350 17 G/17G
17 POWDER, FOR SOLUTION ORAL DAILY PRN
Status: DISCONTINUED | OUTPATIENT
Start: 2024-05-18 | End: 2024-05-24 | Stop reason: HOSPADM

## 2024-05-18 RX ORDER — POTASSIUM CHLORIDE 20 MEQ/1
40 TABLET, EXTENDED RELEASE ORAL PRN
Status: DISCONTINUED | OUTPATIENT
Start: 2024-05-18 | End: 2024-05-24 | Stop reason: HOSPADM

## 2024-05-18 RX ORDER — MORPHINE SULFATE 2 MG/ML
2 INJECTION, SOLUTION INTRAMUSCULAR; INTRAVENOUS EVERY 4 HOURS PRN
Status: DISCONTINUED | OUTPATIENT
Start: 2024-05-18 | End: 2024-05-23

## 2024-05-18 RX ORDER — ONDANSETRON 2 MG/ML
4 INJECTION INTRAMUSCULAR; INTRAVENOUS EVERY 6 HOURS PRN
Status: DISCONTINUED | OUTPATIENT
Start: 2024-05-18 | End: 2024-05-24 | Stop reason: HOSPADM

## 2024-05-18 RX ADMIN — LOSARTAN POTASSIUM 25 MG: 50 TABLET, FILM COATED ORAL at 20:17

## 2024-05-18 RX ADMIN — IOPAMIDOL 80 ML: 755 INJECTION, SOLUTION INTRAVENOUS at 15:27

## 2024-05-18 RX ADMIN — ONDANSETRON HYDROCHLORIDE 4 MG: 2 INJECTION INTRAMUSCULAR; INTRAVENOUS at 21:59

## 2024-05-18 RX ADMIN — HYDROXYZINE HYDROCHLORIDE 50 MG: 25 TABLET, FILM COATED ORAL at 22:42

## 2024-05-18 RX ADMIN — ZOLPIDEM TARTRATE 5 MG: 5 TABLET ORAL at 23:33

## 2024-05-18 RX ADMIN — METOPROLOL TARTRATE 25 MG: 25 TABLET, FILM COATED ORAL at 20:18

## 2024-05-18 RX ADMIN — ENOXAPARIN SODIUM 40 MG: 100 INJECTION SUBCUTANEOUS at 20:22

## 2024-05-18 RX ADMIN — MORPHINE SULFATE 2 MG: 2 INJECTION, SOLUTION INTRAMUSCULAR; INTRAVENOUS at 21:53

## 2024-05-18 RX ADMIN — IPRATROPIUM BROMIDE AND ALBUTEROL SULFATE 1 DOSE: .5; 3 SOLUTION RESPIRATORY (INHALATION) at 14:37

## 2024-05-18 RX ADMIN — SODIUM CHLORIDE, POTASSIUM CHLORIDE, SODIUM LACTATE AND CALCIUM CHLORIDE 1000 ML: 600; 310; 30; 20 INJECTION, SOLUTION INTRAVENOUS at 14:30

## 2024-05-18 RX ADMIN — ATORVASTATIN CALCIUM 40 MG: 20 TABLET, FILM COATED ORAL at 20:16

## 2024-05-18 RX ADMIN — FAMOTIDINE 20 MG: 10 INJECTION, SOLUTION INTRAVENOUS at 14:23

## 2024-05-18 RX ADMIN — ONDANSETRON 4 MG: 2 INJECTION INTRAMUSCULAR; INTRAVENOUS at 14:19

## 2024-05-18 RX ADMIN — HYDRALAZINE HYDROCHLORIDE 5 MG: 20 INJECTION, SOLUTION INTRAMUSCULAR; INTRAVENOUS at 17:06

## 2024-05-18 RX ADMIN — INSULIN LISPRO 2 UNITS: 100 INJECTION, SOLUTION INTRAVENOUS; SUBCUTANEOUS at 20:13

## 2024-05-18 RX ADMIN — MORPHINE SULFATE 4 MG: 4 INJECTION, SOLUTION INTRAMUSCULAR; INTRAVENOUS at 14:19

## 2024-05-18 RX ADMIN — WATER 125 MG: 1 INJECTION INTRAMUSCULAR; INTRAVENOUS; SUBCUTANEOUS at 14:25

## 2024-05-18 RX ADMIN — SODIUM CHLORIDE, PRESERVATIVE FREE 10 ML: 5 INJECTION INTRAVENOUS at 22:03

## 2024-05-18 RX ADMIN — LORAZEPAM 1 MG: 2 INJECTION, SOLUTION INTRAMUSCULAR; INTRAVENOUS at 20:19

## 2024-05-18 ASSESSMENT — PAIN DESCRIPTION - LOCATION: LOCATION: CHEST

## 2024-05-18 ASSESSMENT — PAIN DESCRIPTION - DESCRIPTORS: DESCRIPTORS: STABBING

## 2024-05-18 ASSESSMENT — PAIN SCALES - GENERAL: PAINLEVEL_OUTOF10: 6

## 2024-05-18 ASSESSMENT — PAIN DESCRIPTION - ORIENTATION: ORIENTATION: LEFT

## 2024-05-18 NOTE — ED NOTES
Provider made aware that fluids have not completed due to patient bending her arm. Fluids placed on IV pump to run, Mary GUADARRAMA made aware, will redraw lactic after fluids are complete.

## 2024-05-18 NOTE — ED PROVIDER NOTES
time range)   0.9 % sodium chloride infusion (has no administration in time range)   potassium chloride (KLOR-CON M) extended release tablet 40 mEq (has no administration in time range)     Or   potassium bicarb-citric acid (EFFER-K) effervescent tablet 40 mEq (has no administration in time range)     Or   potassium chloride 10 mEq/100 mL IVPB (Peripheral Line) (has no administration in time range)   magnesium sulfate 2000 mg in 50 mL IVPB premix (has no administration in time range)   ondansetron (ZOFRAN-ODT) disintegrating tablet 4 mg (has no administration in time range)     Or   ondansetron (ZOFRAN) injection 4 mg (has no administration in time range)   polyethylene glycol (GLYCOLAX) packet 17 g (has no administration in time range)   aspirin chewable tablet 81 mg (has no administration in time range)   atorvastatin (LIPITOR) tablet 40 mg (has no administration in time range)   metoprolol tartrate (LOPRESSOR) tablet 25 mg (has no administration in time range)   losartan (COZAAR) tablet 25 mg (has no administration in time range)   nitroGLYCERIN (NITROSTAT) SL tablet 0.4 mg (has no administration in time range)   enoxaparin (LOVENOX) injection 40 mg (has no administration in time range)   LORazepam (ATIVAN) injection 1 mg (has no administration in time range)   morphine (PF) injection 2 mg (has no administration in time range)   morphine sulfate (PF) injection 4 mg (has no administration in time range)   morphine sulfate (PF) injection 4 mg (4 mg IntraVENous Given 5/18/24 1419)   ondansetron (ZOFRAN) injection 4 mg (4 mg IntraVENous Given 5/18/24 1419)   famotidine (PEPCID) 20 mg in sodium chloride (PF) 0.9 % 10 mL injection (20 mg IntraVENous Given 5/18/24 1423)   ipratropium 0.5 mg-albuterol 2.5 mg (DUONEB) nebulizer solution 1 Dose (1 Dose Inhalation Given 5/18/24 1437)   methylPREDNISolone sodium succ (SOLU-MEDROL) 125 mg in sterile water 2 mL injection (125 mg IntraVENous Given 5/18/24 1425)   lactated  as: DASIA                     I am the Primary Clinician of Record.       (Please note that parts of this dictation were completed with voice recognition software. Quite often unanticipated grammatical, syntax, homophones, and other interpretive errors are inadvertently transcribed by the computer software. Please disregards these errors. Please excuse any errors that have escaped final proofreading.)       Rosita Hernandez PA-C  05/18/24 2635

## 2024-05-18 NOTE — H&P (VIEW-ONLY)
Consult Note      Reason for Consult:   Cardiology consultation was requested for chest pain.    HISTORY OF PRESENT ILLNESS:    Ms. Thao is a 64-year-old female with history of type 2 diabetes, asthma/COPD overlap syndrome on home O2, hep C, multiple sclerosis, hypertension and hyperlipidemia.  She was in her usual state of health until 3 days ago when she started to experience intermittent episodes of chest pain primarily under her left breast.  These episodes would last for about 30 minutes but no obvious trigger or relation to food intake.  Due to worsening of her symptoms as well as some chest heaviness and neck discomfort, she elected to come to the emergency room for further evaluation.    On initial presentation, she was hemodynamically stable.  Pertinent findings include normal electrolytes, elevated glucose of 179, hemoglobin 11.2, NT proBNP of 235 and high sensitive troponin of 6.  CTA of the chest was negative for PE or aneurysm.  However patient tells me that she was told in December that she has aortic aneurysm and I have reviewed the report and I suspect there may be a typo.  ECG tracing to my review shows sinus rhythm, rate 82, poor R wave progression but no acute abnormality.  Past Medical History:        Diagnosis Date    Asthma     Hepatitis C     Multiple sclerosis (HCC)        Past Surgical History:        Procedure Laterality Date    APPENDECTOMY      HYSTERECTOMY      THYROIDECTOMY         MEDICATIONS:    Home:  No current facility-administered medications on file prior to encounter.     Current Outpatient Medications on File Prior to Encounter   Medication Sig Dispense Refill    hydrOXYzine HCl (ATARAX) 50 MG tablet Take 50 mg by mouth 3 times daily as needed      levothyroxine (SYNTHROID) 112 MCG tablet Take by mouth every morning (before breakfast)      LORazepam (ATIVAN) 0.5 MG tablet Take by mouth.      OLANZapine (ZYPREXA) 2.5 MG tablet Take by mouth      ondansetron (ZOFRAN) 4 MG  11.2 05/18/2024 01:50 PM    HCT 36.6 05/18/2024 01:50 PM    MCV 69.6 05/18/2024 01:50 PM    RDW 18.6 05/18/2024 01:50 PM     05/18/2024 01:50 PM     PT/INR:  No results found for: \"PTINR\"  Magnesium:    Lab Results   Component Value Date/Time    MG 2.2 05/18/2024 02:26 PM     TSH:  No results found for: \"TSH\"  TROPONIN:  No components found for: \"TROP\"  BNP:  No results found for: \"BNP\"  FASTING LIPID PANEL:  No results found for: \"CHOL\", \"HDL\", \"TRIG\"    RADIOLOGY:  CTA CHEST W WO CONTRAST    Result Date: 5/18/2024  No evidence of pulmonary embolus. No acute cardiopulmonary process. No evidence of aneurysm or dissection. Background of emphysema and bibasilar atelectasis     XR CHEST PORTABLE    Result Date: 5/18/2024  No acute cardiopulmonary disease.       Cardiology Procedures:   No results found for this or any previous visit. No results found for this or any previous visit.    No results found for this or any previous visit.      IMPRESSION/RECOMMENDATIONS:    1.  Chest pain: Atypical and clinical presentation does not suggest ACS.  She has multiple risk factors for atherosclerosis and would benefit from ischemic evaluation.  2D echocardiogram in a.m. and likely stress test on Monday.  2.  Hypertension: She is currently not on any antihypertensive agent but was previously on losartan HCTZ 5012.5 mg daily.  Compliance?  3.  Type 2 diabetes: Currently on no meds but previously on Trulicity.  4.  Hyperlipidemia: Currently on no medications.  She is diabetic and would benefit from statin therapy.  5.  Asthma/COPD overlap syndrome: On home O2  6.  Multiple sclerosis: Known history but no recent flareup.  7.  Aortic aneurysm: No evidence of aneurysm noted on study done today.  Previous CTA done in December there was a question of thoracic aortic aneurysm but I suspect dictated measurements were likely a typo.  It would be helpful to get radiology to compare images for clarification.

## 2024-05-18 NOTE — CONSULTS
Consult Note      Reason for Consult:   Cardiology consultation was requested for chest pain.    HISTORY OF PRESENT ILLNESS:    Ms. Thao is a 64-year-old female with history of type 2 diabetes, asthma/COPD overlap syndrome on home O2, hep C, multiple sclerosis, hypertension and hyperlipidemia.  She was in her usual state of health until 3 days ago when she started to experience intermittent episodes of chest pain primarily under her left breast.  These episodes would last for about 30 minutes but no obvious trigger or relation to food intake.  Due to worsening of her symptoms as well as some chest heaviness and neck discomfort, she elected to come to the emergency room for further evaluation.    On initial presentation, she was hemodynamically stable.  Pertinent findings include normal electrolytes, elevated glucose of 179, hemoglobin 11.2, NT proBNP of 235 and high sensitive troponin of 6.  CTA of the chest was negative for PE or aneurysm.  However patient tells me that she was told in December that she has aortic aneurysm and I have reviewed the report and I suspect there may be a typo.  ECG tracing to my review shows sinus rhythm, rate 82, poor R wave progression but no acute abnormality.  Past Medical History:        Diagnosis Date    Asthma     Hepatitis C     Multiple sclerosis (HCC)        Past Surgical History:        Procedure Laterality Date    APPENDECTOMY      HYSTERECTOMY      THYROIDECTOMY         MEDICATIONS:    Home:  No current facility-administered medications on file prior to encounter.     Current Outpatient Medications on File Prior to Encounter   Medication Sig Dispense Refill    hydrOXYzine HCl (ATARAX) 50 MG tablet Take 50 mg by mouth 3 times daily as needed      levothyroxine (SYNTHROID) 112 MCG tablet Take by mouth every morning (before breakfast)      LORazepam (ATIVAN) 0.5 MG tablet Take by mouth.      OLANZapine (ZYPREXA) 2.5 MG tablet Take by mouth      ondansetron (ZOFRAN) 4 MG

## 2024-05-18 NOTE — ED TRIAGE NOTES
Pt brought into er via ems for complaints of right sided chest pain x 3 days, worse today describes as sharp.

## 2024-05-18 NOTE — ED NOTES
Bedside and Verbal shift change report given to Farideh (oncoming nurse) by KIN PEACE RN   (offgoing nurse). Report included the following information Nurse Handoff Report, ED SBAR, MAR, and Recent Results.

## 2024-05-18 NOTE — H&P
3.4 - 5.0 g/dL 3.4          Globulin 2.0 - 4.0 g/dL 2.9          Alkaline Phosphatase 45 - 117 U/L 144 (H)          ALT 13 - 56 U/L 55          AST 10 - 38 U/L 68 (H)          Total Bilirubin 0.2 - 1.0 MG/DL 0.4          Lipase 13 - 75 U/L   39        TSH, 3rd Generation 0.36 - 3.74 uIU/mL   0.47        WBC 4.6 - 13.2 K/uL 7.2          RBC 4.20 - 5.30 M/uL 5.26          Hemoglobin Quant 12.0 - 16.0 g/dL 11.2 (L)          Hematocrit 35.0 - 45.0 % 36.6          MCV 78.0 - 100.0 FL 69.6 (L)          MCH 24.0 - 34.0 PG 21.3 (L)          MCHC 31.0 - 37.0 g/dL 30.6 (L)          MPV 9.2 - 11.8 FL 10.3          RDW 11.6 - 14.5 % 18.6 (H)          Platelet Count 135 - 420 K/uL 195          Platelet Comment -   ADEQUATE PLATELETS          Neutrophils % 40 - 73 % 52          Lymphocyte % 21 - 52 % 33          Monocytes % 3 - 10 % 9          Eosinophils % 0 - 5 % 3          Basophils % 0 - 2 % 2          Neutrophils Absolute 1.8 - 8.0 K/UL 3.8          Lymphocytes Absolute 0.9 - 3.6 K/UL 2.4          Monocytes Absolute 0.05 - 1.2 K/UL 0.6          Eosinophils Absolute 0.0 - 0.4 K/UL 0.2          Basophils Absolute 0.0 - 0.1 K/UL 0.1          Differential Type -   AUTOMATED          Immature Granulocytes % 0.0 - 0.5 % 1 (H)          Nucleated Red Blood Cells 0  WBC  0.00 - 0.01 K/uL 0.0  0.00          Immature Granulocytes Absolute 0.00 - 0.04 K/UL 0.1 (H)          RBC Comment -    -   MICROCYTOSIS  2+    POLYCHROMASIA  OCCASIONAL  SCHISTOCYTES  OCCASIONAL            CULTURE, BLOOD 1  Rpt          CULTURE, BLOOD 2  Rpt          Rapid Influenza A By PCR NOTD      Not detected       Rapid Influenza B By PCR NOTD      Not detected       SARS-CoV-2, PCR NOTD      Not detected       Color, UA -         YELLOW    Glucose, Ur NEG mg/dL       Negative    Bilirubin, Urine NEG         Negative    Ketones, Urine NEG mg/dL       Negative    Specific Gravity, UA 1.005 - 1.030         1.017    Blood, Urine NEG         Negative

## 2024-05-19 ENCOUNTER — APPOINTMENT (OUTPATIENT)
Facility: HOSPITAL | Age: 64
DRG: 383 | End: 2024-05-19
Attending: INTERNAL MEDICINE
Payer: MEDICAID

## 2024-05-19 LAB
ANION GAP SERPL CALC-SCNC: 7 MMOL/L (ref 3–18)
BUN SERPL-MCNC: 7 MG/DL (ref 7–18)
BUN/CREAT SERPL: 7 (ref 12–20)
CALCIUM SERPL-MCNC: 9.5 MG/DL (ref 8.5–10.1)
CHLORIDE SERPL-SCNC: 107 MMOL/L (ref 100–111)
CHOLEST SERPL-MCNC: 162 MG/DL
CO2 SERPL-SCNC: 26 MMOL/L (ref 21–32)
CREAT SERPL-MCNC: 0.94 MG/DL (ref 0.6–1.3)
ECHO AO ASC DIAM: 3.3 CM
ECHO AO ASCENDING AORTA INDEX: 1.69 CM/M2
ECHO AO ROOT DIAM: 3.9 CM
ECHO AO ROOT INDEX: 2 CM/M2
ECHO AV AREA PEAK VELOCITY: 2.6 CM2
ECHO AV AREA VTI: 2.7 CM2
ECHO AV AREA/BSA PEAK VELOCITY: 1.3 CM2/M2
ECHO AV AREA/BSA VTI: 1.4 CM2/M2
ECHO AV MEAN GRADIENT: 3 MMHG
ECHO AV MEAN VELOCITY: 0.8 M/S
ECHO AV PEAK GRADIENT: 5 MMHG
ECHO AV PEAK VELOCITY: 1.1 M/S
ECHO AV VELOCITY RATIO: 0.82
ECHO AV VTI: 28.6 CM
ECHO BSA: 2 M2
ECHO LA DIAMETER INDEX: 1.79 CM/M2
ECHO LA DIAMETER: 3.5 CM
ECHO LA TO AORTIC ROOT RATIO: 0.9
ECHO LA VOL A-L A2C: 49 ML (ref 22–52)
ECHO LA VOL A-L A4C: 48 ML (ref 22–52)
ECHO LA VOL BP: 48 ML (ref 22–52)
ECHO LA VOL MOD A2C: 47 ML (ref 22–52)
ECHO LA VOL MOD A4C: 46 ML (ref 22–52)
ECHO LA VOL/BSA BIPLANE: 25 ML/M2 (ref 16–34)
ECHO LA VOLUME AREA LENGTH: 50 ML
ECHO LA VOLUME INDEX A-L A2C: 25 ML/M2 (ref 16–34)
ECHO LA VOLUME INDEX A-L A4C: 25 ML/M2 (ref 16–34)
ECHO LA VOLUME INDEX AREA LENGTH: 26 ML/M2 (ref 16–34)
ECHO LA VOLUME INDEX MOD A2C: 24 ML/M2 (ref 16–34)
ECHO LA VOLUME INDEX MOD A4C: 24 ML/M2 (ref 16–34)
ECHO LV E' LATERAL VELOCITY: 8 CM/S
ECHO LV E' SEPTAL VELOCITY: 6 CM/S
ECHO LV EDV A2C: 87 ML
ECHO LV EDV A4C: 92 ML
ECHO LV EDV BP: 90 ML (ref 56–104)
ECHO LV EDV INDEX A4C: 47 ML/M2
ECHO LV EDV INDEX BP: 46 ML/M2
ECHO LV EDV NDEX A2C: 45 ML/M2
ECHO LV EJECTION FRACTION A2C: 69 %
ECHO LV EJECTION FRACTION A4C: 68 %
ECHO LV EJECTION FRACTION BIPLANE: 68 % (ref 55–100)
ECHO LV ESV A2C: 27 ML
ECHO LV ESV A4C: 30 ML
ECHO LV ESV BP: 29 ML (ref 19–49)
ECHO LV ESV INDEX A2C: 14 ML/M2
ECHO LV ESV INDEX A4C: 15 ML/M2
ECHO LV ESV INDEX BP: 15 ML/M2
ECHO LV FRACTIONAL SHORTENING: 32 % (ref 28–44)
ECHO LV INTERNAL DIMENSION DIASTOLE INDEX: 2.1 CM/M2
ECHO LV INTERNAL DIMENSION DIASTOLIC: 4.1 CM (ref 3.9–5.3)
ECHO LV INTERNAL DIMENSION SYSTOLIC INDEX: 1.44 CM/M2
ECHO LV INTERNAL DIMENSION SYSTOLIC: 2.8 CM
ECHO LV IVSD: 1 CM (ref 0.6–0.9)
ECHO LV MASS 2D: 132.1 G (ref 67–162)
ECHO LV MASS INDEX 2D: 67.7 G/M2 (ref 43–95)
ECHO LV POSTERIOR WALL DIASTOLIC: 1 CM (ref 0.6–0.9)
ECHO LV RELATIVE WALL THICKNESS RATIO: 0.49
ECHO LVOT AREA: 3.5 CM2
ECHO LVOT AV VTI INDEX: 0.79
ECHO LVOT DIAM: 2.1 CM
ECHO LVOT MEAN GRADIENT: 2 MMHG
ECHO LVOT PEAK GRADIENT: 3 MMHG
ECHO LVOT PEAK VELOCITY: 0.9 M/S
ECHO LVOT STROKE VOLUME INDEX: 40.3 ML/M2
ECHO LVOT SV: 78.6 ML
ECHO LVOT VTI: 22.7 CM
ECHO MV A VELOCITY: 0.83 M/S
ECHO MV E DECELERATION TIME (DT): 257.6 MS
ECHO MV E VELOCITY: 0.68 M/S
ECHO MV E/A RATIO: 0.82
ECHO MV E/E' LATERAL: 8.5
ECHO MV E/E' RATIO (AVERAGED): 9.92
ECHO MV E/E' SEPTAL: 11.33
ECHO RA END SYSTOLIC VOLUME APICAL 4 CHAMBER INDEX BSA: 14 ML/M2
ECHO RA VOLUME: 27 ML
ECHO RV INTERNAL DIMENSION: 2.8 CM
ECHO RV TAPSE: 1.7 CM (ref 1.7–?)
ECHO RVOT MEAN GRADIENT: 1 MMHG
ECHO RVOT PEAK GRADIENT: 1 MMHG
ECHO RVOT PEAK VELOCITY: 0.5 M/S
ECHO RVOT VTI: 14.8 CM
EKG ATRIAL RATE: 82 BPM
EKG ATRIAL RATE: 90 BPM
EKG DIAGNOSIS: NORMAL
EKG DIAGNOSIS: NORMAL
EKG P AXIS: 50 DEGREES
EKG P AXIS: 75 DEGREES
EKG P-R INTERVAL: 172 MS
EKG P-R INTERVAL: 204 MS
EKG Q-T INTERVAL: 366 MS
EKG Q-T INTERVAL: 402 MS
EKG QRS DURATION: 80 MS
EKG QRS DURATION: 84 MS
EKG QTC CALCULATION (BAZETT): 447 MS
EKG QTC CALCULATION (BAZETT): 469 MS
EKG R AXIS: -15 DEGREES
EKG R AXIS: 40 DEGREES
EKG T AXIS: 87 DEGREES
EKG T AXIS: 99 DEGREES
EKG VENTRICULAR RATE: 82 BPM
EKG VENTRICULAR RATE: 90 BPM
ERYTHROCYTE [DISTWIDTH] IN BLOOD BY AUTOMATED COUNT: 18.9 % (ref 11.6–14.5)
GLUCOSE BLD STRIP.AUTO-MCNC: 106 MG/DL (ref 70–110)
GLUCOSE BLD STRIP.AUTO-MCNC: 113 MG/DL (ref 70–110)
GLUCOSE BLD STRIP.AUTO-MCNC: 122 MG/DL (ref 70–110)
GLUCOSE BLD STRIP.AUTO-MCNC: 248 MG/DL (ref 70–110)
GLUCOSE BLD STRIP.AUTO-MCNC: 249 MG/DL (ref 70–110)
GLUCOSE SERPL-MCNC: 275 MG/DL (ref 74–99)
HCT VFR BLD AUTO: 36.9 % (ref 35–45)
HDLC SERPL-MCNC: 38 MG/DL (ref 40–60)
HDLC SERPL: 4.3 (ref 0–5)
HGB BLD-MCNC: 11.4 G/DL (ref 12–16)
LDLC SERPL CALC-MCNC: 99.2 MG/DL (ref 0–100)
LIPID PANEL: ABNORMAL
MAGNESIUM SERPL-MCNC: 2.1 MG/DL (ref 1.6–2.6)
MCH RBC QN AUTO: 21.9 PG (ref 24–34)
MCHC RBC AUTO-ENTMCNC: 30.9 G/DL (ref 31–37)
MCV RBC AUTO: 70.8 FL (ref 78–100)
NRBC # BLD: 0 K/UL (ref 0–0.01)
NRBC BLD-RTO: 0 PER 100 WBC
PLATELET # BLD AUTO: 183 K/UL (ref 135–420)
PMV BLD AUTO: 10 FL (ref 9.2–11.8)
POTASSIUM SERPL-SCNC: 4.1 MMOL/L (ref 3.5–5.5)
RBC # BLD AUTO: 5.21 M/UL (ref 4.2–5.3)
SODIUM SERPL-SCNC: 140 MMOL/L (ref 136–145)
TRIGL SERPL-MCNC: 124 MG/DL
TROPONIN I SERPL HS-MCNC: 4 NG/L (ref 0–54)
TROPONIN I SERPL HS-MCNC: 5 NG/L (ref 0–54)
VLDLC SERPL CALC-MCNC: 24.8 MG/DL
WBC # BLD AUTO: 6.5 K/UL (ref 4.6–13.2)

## 2024-05-19 PROCEDURE — 6360000002 HC RX W HCPCS: Performed by: INTERNAL MEDICINE

## 2024-05-19 PROCEDURE — 83735 ASSAY OF MAGNESIUM: CPT

## 2024-05-19 PROCEDURE — 96375 TX/PRO/DX INJ NEW DRUG ADDON: CPT

## 2024-05-19 PROCEDURE — 80048 BASIC METABOLIC PNL TOTAL CA: CPT

## 2024-05-19 PROCEDURE — 6370000000 HC RX 637 (ALT 250 FOR IP): Performed by: FAMILY MEDICINE

## 2024-05-19 PROCEDURE — 36415 COLL VENOUS BLD VENIPUNCTURE: CPT

## 2024-05-19 PROCEDURE — 2580000003 HC RX 258: Performed by: INTERNAL MEDICINE

## 2024-05-19 PROCEDURE — 93306 TTE W/DOPPLER COMPLETE: CPT | Performed by: INTERNAL MEDICINE

## 2024-05-19 PROCEDURE — 84484 ASSAY OF TROPONIN QUANT: CPT

## 2024-05-19 PROCEDURE — 82962 GLUCOSE BLOOD TEST: CPT

## 2024-05-19 PROCEDURE — 99212 OFFICE O/P EST SF 10 MIN: CPT | Performed by: INTERNAL MEDICINE

## 2024-05-19 PROCEDURE — 96376 TX/PRO/DX INJ SAME DRUG ADON: CPT

## 2024-05-19 PROCEDURE — 93010 ELECTROCARDIOGRAM REPORT: CPT | Performed by: INTERNAL MEDICINE

## 2024-05-19 PROCEDURE — 93306 TTE W/DOPPLER COMPLETE: CPT

## 2024-05-19 PROCEDURE — 6370000000 HC RX 637 (ALT 250 FOR IP): Performed by: INTERNAL MEDICINE

## 2024-05-19 PROCEDURE — G0378 HOSPITAL OBSERVATION PER HR: HCPCS

## 2024-05-19 PROCEDURE — 80061 LIPID PANEL: CPT

## 2024-05-19 PROCEDURE — 85027 COMPLETE CBC AUTOMATED: CPT

## 2024-05-19 PROCEDURE — 2500000003 HC RX 250 WO HCPCS: Performed by: FAMILY MEDICINE

## 2024-05-19 RX ORDER — AMLODIPINE BESYLATE 5 MG/1
5 TABLET ORAL DAILY
Status: DISCONTINUED | OUTPATIENT
Start: 2024-05-19 | End: 2024-05-20

## 2024-05-19 RX ORDER — PREGABALIN 50 MG/1
50 CAPSULE ORAL 3 TIMES DAILY
COMMUNITY

## 2024-05-19 RX ORDER — LEVOTHYROXINE SODIUM 0.07 MG/1
112 TABLET ORAL DAILY
Status: DISCONTINUED | OUTPATIENT
Start: 2024-05-20 | End: 2024-05-24 | Stop reason: HOSPADM

## 2024-05-19 RX ORDER — INSULIN GLARGINE 100 [IU]/ML
10 INJECTION, SOLUTION SUBCUTANEOUS NIGHTLY
COMMUNITY

## 2024-05-19 RX ORDER — DIPHENHYDRAMINE HCL 25 MG
25 CAPSULE ORAL EVERY 6 HOURS PRN
COMMUNITY

## 2024-05-19 RX ORDER — METOPROLOL TARTRATE 1 MG/ML
5 INJECTION, SOLUTION INTRAVENOUS ONCE
Status: COMPLETED | OUTPATIENT
Start: 2024-05-19 | End: 2024-05-19

## 2024-05-19 RX ORDER — OMEPRAZOLE 20 MG/1
20 CAPSULE, DELAYED RELEASE ORAL DAILY
COMMUNITY

## 2024-05-19 RX ORDER — LOSARTAN POTASSIUM 25 MG/1
25 TABLET ORAL ONCE
Status: COMPLETED | OUTPATIENT
Start: 2024-05-19 | End: 2024-05-19

## 2024-05-19 RX ORDER — LOSARTAN POTASSIUM 50 MG/1
50 TABLET ORAL DAILY
Status: DISCONTINUED | OUTPATIENT
Start: 2024-05-20 | End: 2024-05-24 | Stop reason: HOSPADM

## 2024-05-19 RX ADMIN — MORPHINE SULFATE 4 MG: 4 INJECTION, SOLUTION INTRAMUSCULAR; INTRAVENOUS at 07:39

## 2024-05-19 RX ADMIN — ENOXAPARIN SODIUM 40 MG: 100 INJECTION SUBCUTANEOUS at 08:17

## 2024-05-19 RX ADMIN — ONDANSETRON HYDROCHLORIDE 4 MG: 2 INJECTION INTRAMUSCULAR; INTRAVENOUS at 16:10

## 2024-05-19 RX ADMIN — LOSARTAN POTASSIUM 25 MG: 50 TABLET, FILM COATED ORAL at 08:16

## 2024-05-19 RX ADMIN — ATORVASTATIN CALCIUM 40 MG: 20 TABLET, FILM COATED ORAL at 21:19

## 2024-05-19 RX ADMIN — SODIUM CHLORIDE, PRESERVATIVE FREE 10 ML: 5 INJECTION INTRAVENOUS at 21:24

## 2024-05-19 RX ADMIN — METOPROLOL TARTRATE 5 MG: 1 INJECTION, SOLUTION INTRAVENOUS at 17:52

## 2024-05-19 RX ADMIN — NITROGLYCERIN 0.4 MG: 0.4 TABLET, ORALLY DISINTEGRATING SUBLINGUAL at 16:18

## 2024-05-19 RX ADMIN — INSULIN LISPRO 2 UNITS: 100 INJECTION, SOLUTION INTRAVENOUS; SUBCUTANEOUS at 07:40

## 2024-05-19 RX ADMIN — SODIUM CHLORIDE, PRESERVATIVE FREE 10 ML: 5 INJECTION INTRAVENOUS at 08:24

## 2024-05-19 RX ADMIN — AMLODIPINE BESYLATE 5 MG: 5 TABLET ORAL at 11:36

## 2024-05-19 RX ADMIN — ONDANSETRON HYDROCHLORIDE 4 MG: 2 INJECTION INTRAMUSCULAR; INTRAVENOUS at 07:57

## 2024-05-19 RX ADMIN — METOPROLOL TARTRATE 25 MG: 25 TABLET, FILM COATED ORAL at 21:19

## 2024-05-19 RX ADMIN — METOPROLOL TARTRATE 25 MG: 25 TABLET, FILM COATED ORAL at 08:16

## 2024-05-19 RX ADMIN — MORPHINE SULFATE 4 MG: 4 INJECTION, SOLUTION INTRAMUSCULAR; INTRAVENOUS at 23:57

## 2024-05-19 RX ADMIN — LOSARTAN POTASSIUM 25 MG: 25 TABLET, FILM COATED ORAL at 21:19

## 2024-05-19 RX ADMIN — MORPHINE SULFATE 4 MG: 4 INJECTION, SOLUTION INTRAMUSCULAR; INTRAVENOUS at 15:55

## 2024-05-19 RX ADMIN — ASPIRIN 81 MG: 81 TABLET, CHEWABLE ORAL at 08:16

## 2024-05-19 RX ADMIN — ZOLPIDEM TARTRATE 5 MG: 5 TABLET ORAL at 21:23

## 2024-05-19 ASSESSMENT — PAIN DESCRIPTION - FREQUENCY: FREQUENCY: INTERMITTENT

## 2024-05-19 ASSESSMENT — PAIN SCALES - GENERAL
PAINLEVEL_OUTOF10: 3
PAINLEVEL_OUTOF10: 8
PAINLEVEL_OUTOF10: 5
PAINLEVEL_OUTOF10: 10
PAINLEVEL_OUTOF10: 4
PAINLEVEL_OUTOF10: 5
PAINLEVEL_OUTOF10: 0

## 2024-05-19 ASSESSMENT — PAIN DESCRIPTION - ORIENTATION
ORIENTATION: MID
ORIENTATION: MID
ORIENTATION: LEFT
ORIENTATION: LEFT

## 2024-05-19 ASSESSMENT — PAIN DESCRIPTION - LOCATION
LOCATION: CHEST

## 2024-05-19 ASSESSMENT — PAIN - FUNCTIONAL ASSESSMENT
PAIN_FUNCTIONAL_ASSESSMENT: PREVENTS OR INTERFERES SOME ACTIVE ACTIVITIES AND ADLS
PAIN_FUNCTIONAL_ASSESSMENT: PREVENTS OR INTERFERES SOME ACTIVE ACTIVITIES AND ADLS

## 2024-05-19 ASSESSMENT — PAIN DESCRIPTION - ONSET: ONSET: PROGRESSIVE

## 2024-05-19 ASSESSMENT — PAIN DESCRIPTION - DESCRIPTORS
DESCRIPTORS: STABBING

## 2024-05-19 ASSESSMENT — PAIN DESCRIPTION - PAIN TYPE: TYPE: ACUTE PAIN

## 2024-05-19 NOTE — CARE COORDINATION
05/19/24 1049   Service Assessment   Patient Orientation Alert and Oriented   Cognition Alert   History Provided By Patient   Primary Caregiver Self   Accompanied By/Relationship Rojas Macdonald/shannon   Support Systems Spouse/Significant Other   PCP Verified by CM Yes   Last Visit to PCP Within last 3 months   Prior Functional Level Independent in ADLs/IADLs  (uses a RW/wheelchair PRN)   Current Functional Level Other (see comment)   Can patient return to prior living arrangement Yes   Ability to make needs known: Good   Family able to assist with home care needs: Yes  (plans to have her shannon assit with care at home and drive her home when discharged)   Would you like for me to discuss the discharge plan with any other family members/significant others, and if so, who? Yes  (Rojas Macdonald/shannon)   Financial Resources Medicaid   Community Resources Other (Comment)   CM/SW Referral Other (see comment)   Social/Functional History   Lives With Significant other   Type of Home House   Home Layout One level   ADL Assistance Independent   Active  Yes   Occupation Part time employment;Other(comment)  (\"Semi-Retired\")   Discharge Planning   Type of Residence House   Living Arrangements Spouse/Significant Other   Current Services Prior To Admission Oxygen Therapy;Durable Medical Equipment  (3L of home O2. has concentrator/tank/portable concentrator via [employer].)   Current DME Prior to Arrival Oxygen Therapy (Comment);Other (Comment)  (has personal nebulizer)   Potential Assistance Needed Other (Comment)  (possible HH)   DME Ordered? No   Potential Assistance Purchasing Medications No   Type of Home Care Services None   Patient expects to be discharged to: House   One/Two Story Residence One story   History of falls? 0   Services At/After Discharge   Transition of Care Consult (CM Consult) Discharge Planning   Condition of Participation: Discharge Planning   The Plan for Transition of Care is related to the

## 2024-05-19 NOTE — PROGRESS NOTES
Hospitalist Progress Note    Patient: India Thao MRN: [id number]  CSN: [id number]    YOB: 1960  Age: 64 y.o.  Sex: female    DOA: 5/18/2024 LOS:  LOS: 0 days          Chief Complaint:    chest pain    Assessment/Plan   Assessment and Plan:     1.  Atypical chest pain.  -Observation with telemetry monitoring.  -Trend cardiac isoenzymes.  -Pain management.  -Aspirin 81 mg daily.  -Empiric statin therapy.  -Metoprolol 25 mg p.o. twice daily.  -Echocardiogram.  -Lexiscan stress test recommended  -Cardiology following     2.  COPD.  -Stable.  -Continue bronchodilators and supplemental oxygen.     3.  Chronic hypoxic respiratory failure.  -Continue customary supplemental oxygen.     4.  Hypertension uncontrolled.  -Initiate metoprolol.  -Monitor blood pressure.  May need more control than this.  -Low-sodium heart healthy diet.     5.  Type 2 diabetes.  -Diabetes surveillance.  -Humalog sliding scale.  -Hemoglobin A1c     6.  Hyperlipidemia.  -Assess lipid status     7.  History of hepatitis C.  -History noted.     8.  History of multiple sclerosis.  -History noted.     9.  DVT prevention.  -Enoxaparin.     10.  CODE STATUS.  -Full code.     11.  Disposition.  -Anticipate disposition home in 48 hours.      Active Hospital Problems    Diagnosis Date Noted    Atypical chest pain [R07.89] 05/18/2024    Chronic hypoxic respiratory failure (HCC) [J96.11] 05/18/2024    Centrilobular emphysema (HCC) [J43.2] 05/18/2024    Anxiety [F41.9] 05/18/2024    Inadequately controlled diabetes mellitus (HCC) [E11.65] 05/18/2024    Hypothyroidism [E03.9] 05/13/2020    Hypertension [I10] 05/13/2020    Hypercholesterolemia [E78.00] 05/13/2020    Chronic hepatitis C (HCC) [B18.2] 05/13/2020       Disposition : 1-2 days      Subjective:    No complaints    Review of systems:    Constitutional: denies fevers, chills, myalgias  Respiratory: denies SOB, cough  Cardiovascular: denies chest pain,

## 2024-05-19 NOTE — PROGRESS NOTES
Oral and Written notification given to patient informing her that patient is currently an Outpatient receiving care in our facility. \"State Outpatient Observation Notice\" provided to the pt. All questions were answered. Signed by patient and placed in bedside chart, copy given to patient.

## 2024-05-19 NOTE — PROGRESS NOTES
Cardiology Progress Note      SUBJECTIVE:    Her chest pain is better but not totally resolved.  She is anxious because her mom had coronary disease.  Troponin biomarkers were negative.  Her blood pressure remains elevated.  2D echocardiogram pending.      OBJECTIVE:   BP (!) 186/71   Pulse 70   Temp 97.7 °F (36.5 °C) (Oral)   Resp 18   Ht 1.651 m (5' 5\")   Wt 87.5 kg (193 lb)   SpO2 98%   BMI 32.12 kg/m²       Intake/Output Summary (Last 24 hours) at 5/19/2024 1101  Last data filed at 5/19/2024 0822  Gross per 24 hour   Intake 240 ml   Output --   Net 240 ml         MEDICATIONS:    Inpatient Medications  Current Facility-Administered Medications   Medication Dose Route Frequency    sodium chloride flush 0.9 % injection 5-40 mL  5-40 mL IntraVENous 2 times per day    sodium chloride flush 0.9 % injection 5-40 mL  5-40 mL IntraVENous PRN    0.9 % sodium chloride infusion   IntraVENous PRN    potassium chloride (KLOR-CON M) extended release tablet 40 mEq  40 mEq Oral PRN    Or    potassium bicarb-citric acid (EFFER-K) effervescent tablet 40 mEq  40 mEq Oral PRN    Or    potassium chloride 10 mEq/100 mL IVPB (Peripheral Line)  10 mEq IntraVENous PRN    magnesium sulfate 2000 mg in 50 mL IVPB premix  2,000 mg IntraVENous PRN    ondansetron (ZOFRAN-ODT) disintegrating tablet 4 mg  4 mg Oral Q8H PRN    Or    ondansetron (ZOFRAN) injection 4 mg  4 mg IntraVENous Q6H PRN    polyethylene glycol (GLYCOLAX) packet 17 g  17 g Oral Daily PRN    aspirin chewable tablet 81 mg  81 mg Oral Daily    atorvastatin (LIPITOR) tablet 40 mg  40 mg Oral Nightly    metoprolol tartrate (LOPRESSOR) tablet 25 mg  25 mg Oral BID    losartan (COZAAR) tablet 25 mg  25 mg Oral Daily    nitroGLYCERIN (NITROSTAT) SL tablet 0.4 mg  0.4 mg SubLINGual Q5 Min PRN    enoxaparin (LOVENOX) injection 40 mg  40 mg SubCUTAneous Daily    morphine (PF) injection 2 mg  2 mg IntraVENous Q4H PRN    morphine sulfate (PF) injection 4 mg  4 mg IntraVENous Q4H  PRN    insulin lispro (HUMALOG,ADMELOG) injection vial 0-8 Units  0-8 Units SubCUTAneous TID WC    insulin lispro (HUMALOG,ADMELOG) injection vial 0-4 Units  0-4 Units SubCUTAneous Nightly    levothyroxine (SYNTHROID) tablet 112 mcg (Patient Supplied)  112 mcg Oral QAM AC    hydrOXYzine HCl (ATARAX) tablet 50 mg  50 mg Oral TID PRN    zolpidem (AMBIEN) tablet 5 mg  5 mg Oral Nightly PRN       Home Medications  No current facility-administered medications on file prior to encounter.     Current Outpatient Medications on File Prior to Encounter   Medication Sig Dispense Refill    diphenhydrAMINE (BENADRYL) 25 MG capsule Take 1 capsule by mouth every 6 hours as needed for Itching or Allergies      omeprazole (PRILOSEC) 20 MG delayed release capsule Take 1 capsule by mouth daily      pregabalin (LYRICA) 50 MG capsule Take 1 capsule by mouth 3 times daily. Max Daily Amount: 150 mg      dulaglutide (TRULICITY) 0.75 MG/0.5ML SOPN SC injection Inject 0.5 mLs into the skin once a week      insulin glargine (LANTUS) 100 UNIT/ML injection vial Inject 10 Units into the skin nightly      tiotropium (SPIRIVA) 18 MCG inhalation capsule Inhale 1 capsule into the lungs daily      hydrOXYzine HCl (ATARAX) 50 MG tablet Take 1 tablet by mouth 3 times daily as needed for Itching      levothyroxine (SYNTHROID) 112 MCG tablet Take by mouth every morning (before breakfast)      LORazepam (ATIVAN) 0.5 MG tablet Take by mouth. (Patient not taking: Reported on 5/18/2024)      OLANZapine (ZYPREXA) 2.5 MG tablet Take by mouth (Patient not taking: Reported on 5/18/2024)      ondansetron (ZOFRAN) 4 MG tablet Take 1 tablet by mouth every 8 hours as needed      pregabalin (LYRICA) 75 MG capsule Take by mouth.      promethazine (PHENERGAN) 25 MG tablet Take 25 mg by mouth every 6 hours as needed (Patient not taking: Reported on 5/18/2024)      zolpidem (AMBIEN) 5 MG tablet Take by mouth.         Allergies   Allergen Reactions    Acetaminophen Other

## 2024-05-19 NOTE — PROGRESS NOTES
0822 Pts family bought in [employer] coffee, RN stressed the importance of no caffeine in relation to her heart. Pt stated she will only drink a \"smidge\" of the coffee. Will continue to monitor.     1139 Amlodipine fell on the floor, wasted pill in [employer] and pulled another from [employer]     1629 Pt complains of 10/10 chest pain radiating down left side of arm, Tele is 73 Sinus Rhythm. Morphine and Nitro given, will continue to monitor. MD paged for notification of 169/74 BP    1732 MD whitaker notified of pt high BP, ordered one-time dose 5mg IV.    1842 Pt /78 50 minutes after metoprolol 5mg IV dose. MD paged    5060 MD will review pt chart.

## 2024-05-19 NOTE — PLAN OF CARE
Problem: Discharge Planning  Goal: Discharge to home or other facility with appropriate resources  5/18/2024 2228 by Arlen Cadena, RN  Outcome: Progressing  5/18/2024 2227 by Arlen Cadena, RN  Outcome: Progressing     Problem: Safety - Adult  Goal: Free from fall injury  5/18/2024 2228 by Arlen Cadena, RN  Outcome: Progressing  5/18/2024 2227 by Arlen Cadena, RN  Outcome: Progressing     Problem: Pain  Goal: Verbalizes/displays adequate comfort level or baseline comfort level  5/18/2024 2228 by Arlen Cadena, RN  Outcome: Progressing  5/18/2024 2228 by Arlen Cadena, RN  Outcome: Progressing

## 2024-05-19 NOTE — PLAN OF CARE
Problem: Discharge Planning  Goal: Discharge to home or other facility with appropriate resources  5/19/2024 0943 by Charis Last RN  Outcome: Progressing  5/18/2024 2228 by Arlen Cadena RN  Outcome: Progressing  5/18/2024 2227 by Arlen Cadena RN  Outcome: Progressing     Problem: Safety - Adult  Goal: Free from fall injury  5/19/2024 0943 by Charis Last RN  Outcome: Progressing  5/18/2024 2228 by Arlen Cadena RN  Outcome: Progressing  5/18/2024 2227 by Arlen Cadena RN  Outcome: Progressing     Problem: Pain  Goal: Verbalizes/displays adequate comfort level or baseline comfort level  5/19/2024 0943 by Charis Last RN  Outcome: Progressing  5/18/2024 2228 by Arlen Cadena RN  Outcome: Progressing  5/18/2024 2228 by Arlen Cadena, RN  Outcome: Progressing

## 2024-05-20 ENCOUNTER — HOSPITAL ENCOUNTER (OUTPATIENT)
Facility: HOSPITAL | Age: 64
Setting detail: OBSERVATION
Discharge: HOME | End: 2024-05-22
Payer: MEDICAID

## 2024-05-20 ENCOUNTER — APPOINTMENT (OUTPATIENT)
Facility: HOSPITAL | Age: 64
DRG: 383 | End: 2024-05-20
Payer: MEDICAID

## 2024-05-20 LAB
ECHO BSA: 2 M2
EKG DIAGNOSIS: NORMAL
GLUCOSE BLD STRIP.AUTO-MCNC: 113 MG/DL (ref 70–110)
GLUCOSE BLD STRIP.AUTO-MCNC: 146 MG/DL (ref 70–110)
GLUCOSE BLD STRIP.AUTO-MCNC: 92 MG/DL (ref 70–110)
GLUCOSE BLD STRIP.AUTO-MCNC: 98 MG/DL (ref 70–110)
NUC STRESS EJECTION FRACTION: 61 %
STRESS BASELINE DIAS BP: 91 MMHG
STRESS BASELINE HR: 67 BPM
STRESS BASELINE SYS BP: 185 MMHG
STRESS ESTIMATED WORKLOAD: 1 METS
STRESS PEAK DIAS BP: 91 MMHG
STRESS PEAK SYS BP: 185 MMHG
STRESS PERCENT HR ACHIEVED: 52 %
STRESS POST PEAK HR: 82 BPM
STRESS RATE PRESSURE PRODUCT: NORMAL BPM*MMHG
STRESS ST DEPRESSION: 0 MM
STRESS TARGET HR: 158 BPM
TID: 0.94
TROPONIN I SERPL HS-MCNC: 6 NG/L (ref 0–54)
TROPONIN I SERPL HS-MCNC: 7 NG/L (ref 0–54)

## 2024-05-20 PROCEDURE — 82962 GLUCOSE BLOOD TEST: CPT

## 2024-05-20 PROCEDURE — 84484 ASSAY OF TROPONIN QUANT: CPT

## 2024-05-20 PROCEDURE — 93017 CV STRESS TEST TRACING ONLY: CPT

## 2024-05-20 PROCEDURE — 93005 ELECTROCARDIOGRAM TRACING: CPT | Performed by: INTERNAL MEDICINE

## 2024-05-20 PROCEDURE — 96376 TX/PRO/DX INJ SAME DRUG ADON: CPT

## 2024-05-20 PROCEDURE — A9500 TC99M SESTAMIBI: HCPCS | Performed by: INTERNAL MEDICINE

## 2024-05-20 PROCEDURE — 6370000000 HC RX 637 (ALT 250 FOR IP): Performed by: HOSPITALIST

## 2024-05-20 PROCEDURE — 96375 TX/PRO/DX INJ NEW DRUG ADDON: CPT

## 2024-05-20 PROCEDURE — 6360000002 HC RX W HCPCS: Performed by: INTERNAL MEDICINE

## 2024-05-20 PROCEDURE — 2580000003 HC RX 258: Performed by: INTERNAL MEDICINE

## 2024-05-20 PROCEDURE — G0378 HOSPITAL OBSERVATION PER HR: HCPCS

## 2024-05-20 PROCEDURE — 36415 COLL VENOUS BLD VENIPUNCTURE: CPT

## 2024-05-20 PROCEDURE — 6370000000 HC RX 637 (ALT 250 FOR IP): Performed by: INTERNAL MEDICINE

## 2024-05-20 PROCEDURE — 6360000002 HC RX W HCPCS: Performed by: HOSPITALIST

## 2024-05-20 PROCEDURE — 3430000000 HC RX DIAGNOSTIC RADIOPHARMACEUTICAL: Performed by: INTERNAL MEDICINE

## 2024-05-20 PROCEDURE — 6370000000 HC RX 637 (ALT 250 FOR IP): Performed by: FAMILY MEDICINE

## 2024-05-20 PROCEDURE — G0378 HOSPITAL OBSERVATION PER HR: HCPCS | Performed by: HOSPITALIST

## 2024-05-20 RX ORDER — KETOROLAC TROMETHAMINE 15 MG/ML
15 INJECTION, SOLUTION INTRAMUSCULAR; INTRAVENOUS EVERY 6 HOURS PRN
Status: DISCONTINUED | OUTPATIENT
Start: 2024-05-20 | End: 2024-05-24 | Stop reason: HOSPADM

## 2024-05-20 RX ORDER — LIDOCAINE 4 G/G
1 PATCH TOPICAL DAILY
Status: DISCONTINUED | OUTPATIENT
Start: 2024-05-20 | End: 2024-05-24 | Stop reason: HOSPADM

## 2024-05-20 RX ORDER — HYDRALAZINE HYDROCHLORIDE 20 MG/ML
10 INJECTION INTRAMUSCULAR; INTRAVENOUS EVERY 6 HOURS PRN
Status: DISCONTINUED | OUTPATIENT
Start: 2024-05-20 | End: 2024-05-24 | Stop reason: HOSPADM

## 2024-05-20 RX ORDER — CLONAZEPAM 0.5 MG/1
0.25 TABLET ORAL EVERY 12 HOURS
Status: DISCONTINUED | OUTPATIENT
Start: 2024-05-20 | End: 2024-05-24 | Stop reason: HOSPADM

## 2024-05-20 RX ORDER — REGADENOSON 0.08 MG/ML
0.4 INJECTION, SOLUTION INTRAVENOUS
Status: COMPLETED | OUTPATIENT
Start: 2024-05-20 | End: 2024-05-20

## 2024-05-20 RX ORDER — KETOROLAC TROMETHAMINE 15 MG/ML
30 INJECTION, SOLUTION INTRAMUSCULAR; INTRAVENOUS
Status: COMPLETED | OUTPATIENT
Start: 2024-05-20 | End: 2024-05-20

## 2024-05-20 RX ORDER — TETRAKIS(2-METHOXYISOBUTYLISOCYANIDE)COPPER(I) TETRAFLUOROBORATE 1 MG/ML
11.5 INJECTION, POWDER, LYOPHILIZED, FOR SOLUTION INTRAVENOUS
Status: COMPLETED | OUTPATIENT
Start: 2024-05-20 | End: 2024-05-20

## 2024-05-20 RX ORDER — TETRAKIS(2-METHOXYISOBUTYLISOCYANIDE)COPPER(I) TETRAFLUOROBORATE 1 MG/ML
36 INJECTION, POWDER, LYOPHILIZED, FOR SOLUTION INTRAVENOUS
Status: COMPLETED | OUTPATIENT
Start: 2024-05-20 | End: 2024-05-20

## 2024-05-20 RX ORDER — AMLODIPINE BESYLATE 5 MG/1
10 TABLET ORAL DAILY
Status: DISCONTINUED | OUTPATIENT
Start: 2024-05-21 | End: 2024-05-24 | Stop reason: HOSPADM

## 2024-05-20 RX ORDER — AMLODIPINE BESYLATE 5 MG/1
5 TABLET ORAL ONCE
Status: COMPLETED | OUTPATIENT
Start: 2024-05-20 | End: 2024-05-20

## 2024-05-20 RX ADMIN — AMLODIPINE BESYLATE 5 MG: 5 TABLET ORAL at 12:21

## 2024-05-20 RX ADMIN — ENOXAPARIN SODIUM 40 MG: 100 INJECTION SUBCUTANEOUS at 08:32

## 2024-05-20 RX ADMIN — MORPHINE SULFATE 4 MG: 4 INJECTION, SOLUTION INTRAMUSCULAR; INTRAVENOUS at 12:24

## 2024-05-20 RX ADMIN — HYDRALAZINE HYDROCHLORIDE 10 MG: 20 INJECTION INTRAMUSCULAR; INTRAVENOUS at 12:20

## 2024-05-20 RX ADMIN — ASPIRIN 81 MG: 81 TABLET, CHEWABLE ORAL at 08:32

## 2024-05-20 RX ADMIN — NITROGLYCERIN 0.4 MG: 0.4 TABLET, ORALLY DISINTEGRATING SUBLINGUAL at 00:07

## 2024-05-20 RX ADMIN — MORPHINE SULFATE 2 MG: 2 INJECTION, SOLUTION INTRAMUSCULAR; INTRAVENOUS at 05:12

## 2024-05-20 RX ADMIN — TETRAKIS(2-METHOXYISOBUTYLISOCYANIDE)COPPER(I) TETRAFLUOROBORATE 11.5 MILLICURIE: 1 INJECTION, POWDER, LYOPHILIZED, FOR SOLUTION INTRAVENOUS at 08:38

## 2024-05-20 RX ADMIN — LEVOTHYROXINE SODIUM 112 MCG: 0.07 TABLET ORAL at 06:30

## 2024-05-20 RX ADMIN — TETRAKIS(2-METHOXYISOBUTYLISOCYANIDE)COPPER(I) TETRAFLUOROBORATE 36 MILLICURIE: 1 INJECTION, POWDER, LYOPHILIZED, FOR SOLUTION INTRAVENOUS at 10:33

## 2024-05-20 RX ADMIN — AMLODIPINE BESYLATE 5 MG: 5 TABLET ORAL at 08:32

## 2024-05-20 RX ADMIN — KETOROLAC TROMETHAMINE 30 MG: 15 INJECTION, SOLUTION INTRAMUSCULAR; INTRAVENOUS at 00:35

## 2024-05-20 RX ADMIN — KETOROLAC TROMETHAMINE 15 MG: 15 INJECTION, SOLUTION INTRAMUSCULAR; INTRAVENOUS at 21:06

## 2024-05-20 RX ADMIN — SODIUM CHLORIDE, PRESERVATIVE FREE 10 ML: 5 INJECTION INTRAVENOUS at 21:01

## 2024-05-20 RX ADMIN — CLONAZEPAM 0.25 MG: 0.5 TABLET ORAL at 14:08

## 2024-05-20 RX ADMIN — NITROGLYCERIN 0.4 MG: 0.4 TABLET, ORALLY DISINTEGRATING SUBLINGUAL at 00:02

## 2024-05-20 RX ADMIN — METOPROLOL TARTRATE 25 MG: 25 TABLET, FILM COATED ORAL at 08:32

## 2024-05-20 RX ADMIN — MORPHINE SULFATE 4 MG: 4 INJECTION, SOLUTION INTRAMUSCULAR; INTRAVENOUS at 19:56

## 2024-05-20 RX ADMIN — METOPROLOL TARTRATE 25 MG: 25 TABLET, FILM COATED ORAL at 21:01

## 2024-05-20 RX ADMIN — LOSARTAN POTASSIUM 50 MG: 50 TABLET, FILM COATED ORAL at 08:32

## 2024-05-20 RX ADMIN — REGADENOSON 0.4 MG: 0.08 INJECTION, SOLUTION INTRAVENOUS at 10:33

## 2024-05-20 RX ADMIN — MORPHINE SULFATE 2 MG: 2 INJECTION, SOLUTION INTRAMUSCULAR; INTRAVENOUS at 00:33

## 2024-05-20 RX ADMIN — ONDANSETRON HYDROCHLORIDE 4 MG: 2 INJECTION INTRAMUSCULAR; INTRAVENOUS at 19:56

## 2024-05-20 RX ADMIN — SODIUM CHLORIDE, PRESERVATIVE FREE 10 ML: 5 INJECTION INTRAVENOUS at 08:34

## 2024-05-20 RX ADMIN — ATORVASTATIN CALCIUM 40 MG: 20 TABLET, FILM COATED ORAL at 21:01

## 2024-05-20 ASSESSMENT — PAIN DESCRIPTION - ONSET
ONSET: ON-GOING
ONSET: ON-GOING

## 2024-05-20 ASSESSMENT — PAIN DESCRIPTION - DESCRIPTORS
DESCRIPTORS: STABBING
DESCRIPTORS: PRESSURE

## 2024-05-20 ASSESSMENT — PAIN SCALES - GENERAL
PAINLEVEL_OUTOF10: 8
PAINLEVEL_OUTOF10: 2
PAINLEVEL_OUTOF10: 2
PAINLEVEL_OUTOF10: 8
PAINLEVEL_OUTOF10: 4
PAINLEVEL_OUTOF10: 0
PAINLEVEL_OUTOF10: 4
PAINLEVEL_OUTOF10: 0
PAINLEVEL_OUTOF10: 3
PAINLEVEL_OUTOF10: 0
PAINLEVEL_OUTOF10: 6
PAINLEVEL_OUTOF10: 7
PAINLEVEL_OUTOF10: 4
PAINLEVEL_OUTOF10: 0
PAINLEVEL_OUTOF10: 8
PAINLEVEL_OUTOF10: 5
PAINLEVEL_OUTOF10: 2
PAINLEVEL_OUTOF10: 7
PAINLEVEL_OUTOF10: 0
PAINLEVEL_OUTOF10: 6

## 2024-05-20 ASSESSMENT — PAIN DESCRIPTION - ORIENTATION
ORIENTATION: LEFT
ORIENTATION: MID
ORIENTATION: LEFT
ORIENTATION: PROXIMAL
ORIENTATION: LEFT
ORIENTATION: PROXIMAL
ORIENTATION: LEFT

## 2024-05-20 ASSESSMENT — PAIN - FUNCTIONAL ASSESSMENT
PAIN_FUNCTIONAL_ASSESSMENT: ACTIVITIES ARE NOT PREVENTED

## 2024-05-20 ASSESSMENT — PAIN DESCRIPTION - FREQUENCY
FREQUENCY: INTERMITTENT
FREQUENCY: INTERMITTENT

## 2024-05-20 ASSESSMENT — PAIN DESCRIPTION - LOCATION
LOCATION: CHEST
LOCATION: BACK;CHEST
LOCATION: CHEST
LOCATION: CHEST
LOCATION: CHEST;BACK

## 2024-05-20 ASSESSMENT — PAIN DESCRIPTION - PAIN TYPE
TYPE: ACUTE PAIN
TYPE: ACUTE PAIN

## 2024-05-20 NOTE — PROGRESS NOTES
Cardiology Progress Note        Patient: India Thao        Sex: female          DOA: 5/18/2024  YOB: 1960      Age:  64 y.o.        LOS:  LOS: 0 days   Assessment/Plan     Principal Problem:    Atypical chest pain  Active Problems:    Hypertension    Hypothyroidism    Hypercholesterolemia    Chronic hepatitis C (HCC)    Chronic hypoxic respiratory failure (HCC)    Centrilobular emphysema (HCC)    Anxiety    Inadequately controlled diabetes mellitus (HCC)  Resolved Problems:    * No resolved hospital problems. *      Plan:    Recurrent chest pain  Symptoms are mixed kind  Patient have multiple risk factor including a smoking diabetes, hyperlipidemia, smoking and family history    Lexiscan nuclear stress test is overall negative for detection of ischemia  Patient still continued to have chest pain  I have discussed with patient accuracy of stress test for detection of CAD is 85% approximately  In view of recurrent chest pain and multiple risk factors I have recommended left heart catheterization with possible PCI  Patient agreed to proceed  Possible procedure on Wednesday since tomorrow Cath Lab schedule is full                      Subjective:    cc:  Chest pain        REVIEW OF SYSTEMS:     General: No fevers or chills.  Cardiovascular: No chest pain or pressure. No palpitations. No ankle swelling  Pulmonary: No SOB, orthopnea, PND  Gastrointestinal: No nausea, vomiting or diarrhea      Objective:      Visit Vitals  /63   Pulse 65   Temp 97.4 °F (36.3 °C) (Oral)   Resp 16   Ht 1.651 m (5' 5\")   Wt 87.5 kg (193 lb)   SpO2 96%   BMI 32.12 kg/m²     Body mass index is 32.12 kg/m².    Physical Exam:  General Appearance: Comfortable, not using accessory muscles of respiration.  NECK: No JVD, no thyroidomeglay.   LUNGS:  bilateral air entry positive   HEART: S1+S2 audible,    ABD: Non-tender, BS Audible    EXT: No edema, and no cysnosis.  VASCULAR EXAM: Pulses are intact.    PSYCHIATRIC EXAM:  zolpidem (AMBIEN) tablet 5 mg  5 mg Oral Nightly PRN               Lab/Data Reviewed:  Procedures/imaging: see electronic medical records for all procedures/Xrays   and details which were not copied into this note but were reviewed prior to creation of Plan        Recent Labs     05/18/24  1350 05/19/24  0037   WBC 7.2 6.5   HGB 11.2* 11.4*   HCT 36.6 36.9    183     Recent Labs     05/18/24  1350 05/19/24  0037    140   K 3.7 4.1    107   CO2 25 26   BUN 6* 7       RADIOLOGY:  No results found.        Cardiology Procedures:   No results found for this or any previous visit. 05/18/24    ECHO (TTE) COMPLETE (PRN CONTRAST/BUBBLE/STRAIN/3D) 05/19/2024  3:13 PM (Final)    Interpretation Summary    Left Ventricle: Normal left ventricular systolic function with a visually estimated EF of 60 - 65%. Left ventricle size is normal. Normal wall thickness. Normal wall motion. Grade I diastolic dysfunction with normal LAP.    Aortic Valve: Trileaflet valve.    Mitral Valve: Mildly thickened leaflet, at the anterior leaflet.    Aorta: Normal sized ascending aorta. Mildly dilated aortic root. Ao root diameter is 3.9 cm.    Image quality is good.    Signed by: Jose Cancino MD on 5/19/2024  3:13 PM    05/18/24    NM STRESS TEST WITH MYOCARDIAL PERFUSION 05/20/2024  5:01 PM (Final)    Interpretation Summary    Stress Combined Conclusion: The study is negative for myocardial ischemia. Findings suggest a low risk of cardiac events.    Stress Function: Left ventricular function post-stress is normal. Post-stress ejection fraction is 61%.    Perfusion Comments: LV perfusion is probably normal. There is no evidence of inducible ischemia.    Perfusion Conclusion: There is no evidence of transient ischemic dilation (TID).    Image quality is good.    ECG: Resting ECG demonstrates normal sinus rhythm.    ECG: The stress ECG was not diagnostic due to resting ST-T abnormalities.    Stress Test: A pharmacological stress test

## 2024-05-20 NOTE — PROGRESS NOTES
RRT called d/t pt c/o 8/10 stabbing chest pain. This RN gave 4 mg IV morphine and 3 doses of SL nitro, with pain increasing again after 3rd dose of nitro.

## 2024-05-20 NOTE — PROGRESS NOTES
MRT was called for chest pain, mid chest radiating to the back, sharp in nature.  Received nitroglycerin x 3, morphine total of 6 mg, and Toradol 30 mg  Pain improved from 9/10  to 4/10 prior to administering Toradol.  EKG showing normal sinus rhythm with no ST changes.  Troponins were negative x 3, added another troponin.  Noncardiac chest pain suspected per admission note and cardiology consult.

## 2024-05-20 NOTE — PROGRESS NOTES
0930 Pt off the floor for nuclear stress test    1116 Pt returned from stress test bp 178/80, MD made aware, n/o received for norvasc 10mg now and prn hydralazine 10mg IV, medications given as ordered.     1220 Pt c/o CP 7/10, IV morphine given as ordered, pt request IV zofran states \"I can't take morphine without zofran.\" Zofran given as ordered.      1300 Pt expressed CP unrelieved by morphine 4mg, MD made aware, n/o obtained for lidocaine patch and toradol 15mg IV, lidocaine patch applied.  BP reassessed 124/74 pulse 69.    1500 Pt CP 4/10 at this time.

## 2024-05-20 NOTE — PLAN OF CARE
Problem: Discharge Planning  Goal: Discharge to home or other facility with appropriate resources  Outcome: Progressing  Flowsheets (Taken 5/20/2024 0842)  Discharge to home or other facility with appropriate resources:   Identify barriers to discharge with patient and caregiver   Arrange for needed discharge resources and transportation as appropriate     Problem: Safety - Adult  Goal: Free from fall injury  5/20/2024 1058 by Citlalli Gilbert, RN  Outcome: Progressing  5/19/2024 2205 by Madie Chun RN  Outcome: Progressing     Problem: Pain  Goal: Verbalizes/displays adequate comfort level or baseline comfort level  5/20/2024 1058 by Citlalli Gilbert RN  Outcome: Progressing  Flowsheets (Taken 5/20/2024 0842)  Verbalizes/displays adequate comfort level or baseline comfort level:   Encourage patient to monitor pain and request assistance   Assess pain using appropriate pain scale  5/19/2024 2205 by Madie Chun, RN  Outcome: Progressing     Problem: Chronic Conditions and Co-morbidities  Goal: Patient's chronic conditions and co-morbidity symptoms are monitored and maintained or improved  Outcome: Progressing  Flowsheets (Taken 5/20/2024 0842)  Care Plan - Patient's Chronic Conditions and Co-Morbidity Symptoms are Monitored and Maintained or Improved: Monitor and assess patient's chronic conditions and comorbid symptoms for stability, deterioration, or improvement

## 2024-05-20 NOTE — PROGRESS NOTES
Hospitalist Progress Note-critical care note     Patient: India Thao MRN: [id number]  CSN: [id number]    YOB: 1960  Age: 64 y.o.  Sex: female    DOA: 5/18/2024 LOS:  LOS: 0 days            Chief complaint: chest pain , emphysema , htn     Assessment/Plan         Active Hospital Problems    Diagnosis Date Noted    Atypical chest pain [R07.89] 05/18/2024    Chronic hypoxic respiratory failure (HCC) [J96.11] 05/18/2024    Centrilobular emphysema (HCC) [J43.2] 05/18/2024    Anxiety [F41.9] 05/18/2024    Inadequately controlled diabetes mellitus (HCC) [E11.65] 05/18/2024    Hypothyroidism [E03.9] 05/13/2020    Hypertension [I10] 05/13/2020    Hypercholesterolemia [E78.00] 05/13/2020    Chronic hepatitis C (HCC) [B18.2] 05/13/2020       Atypical chest pain.  Still having chest pain not sure etiologist at this point -not improving per 4 mg morphine , add toradol   Lidocaine patch , klonopin for anxiety   -Observation with telemetry monitoring.  On -Aspirin 81 mg daily.  -Empiric statin therapy.  -Metoprolol 25 mg p.o. twice daily.  -Echocardiogram.  -Lexiscan stress test done today   -Cardiology following  Cta chest no PE , trop wnl   Uds       COPD.  -Stable.  -Continue bronchodilators and supplemental oxygen.       Chronic hypoxic respiratory failure.  -Continue customary supplemental oxygen.     Hypertension uncontrolled.   -Initiate metoprolol.increase norvasc   -Monitor blood pressure.  May need more control than this.  -Low-sodium heart healthy diet.      Type 2 diabetes.  -Diabetes surveillance.  -Humalog sliding scale.  -Hemoglobin A1c     Hyperlipidemia.  -Assess lipid status     History of hepatitis C.  -History noted    History of multiple sclerosis.  -History noted.        Subjective still in pain left side of chest     Rn still in pain after 4 mg morphine     TIME: E/M Time spent with patient and patient care issues: [] 31-40 mins  [] 41-49 mins  [x] 50 mins or more.      Disposition :1-2  which were not copied into this note but were reviewed prior to creation of Plan    Echo (TTE) complete (PRN contrast/bubble/strain/3D)    Result Date: 5/19/2024    Left Ventricle: Normal left ventricular systolic function with a visually estimated EF of 60 - 65%. Left ventricle size is normal. Normal wall thickness. Normal wall motion. Grade I diastolic dysfunction with normal LAP.   Aortic Valve: Trileaflet valve.   Mitral Valve: Mildly thickened leaflet, at the anterior leaflet.   Aorta: Normal sized ascending aorta. Mildly dilated aortic root. Ao root diameter is 3.9 cm.   Image quality is good.     CTA CHEST W WO CONTRAST    Result Date: 5/18/2024  EXAM:  CTA CHEST W WO CONTRAST INDICATION: Chest pain shortness of breath history of aneurysm. COMPARISON: None TECHNIQUE: Helical thin section chest CT following intravenous administration of nonionic contrast 100 mL of isovue 370 according to departmental PE protocol. Coronal and sagittal reformats were performed. 3D post processing was performed.  CT dose reduction was achieved through the use of a standardized protocol tailored for this examination and automatic exposure control for dose modulation. FINDINGS: This is a good quality study for the evaluation of pulmonary embolism to the first subsegmental arterial level. There is no pulmonary embolism to this level. No axillary or supraclavicular adenopathy. THYROID: No nodule. MEDIASTINUM: No mass or lymphadenopathy. CAITLIN: No mass or lymphadenopathy. THORACIC AORTA: No aneurysm. HEART: Normal in size. ESOPHAGUS: No wall thickening or dilatation. TRACHEA/BRONCHI: Patent. PLEURA: No effusion or pneumothorax. LUNGS: Bibasilar airspace opacities likely atelectasis. Emphysema UPPER ABDOMEN: Cyst in the left hepatic lobe BONES: No aggressive bone lesion or fracture.     No evidence of pulmonary embolus. No acute cardiopulmonary process. No evidence of aneurysm or dissection. Background of emphysema and bibasilar

## 2024-05-20 NOTE — PLAN OF CARE
Problem: Safety - Adult  Goal: Free from fall injury  5/19/2024 2205 by Madie Chun, RN  Outcome: Progressing  5/19/2024 0943 by Charis Last RN  Outcome: Progressing     Problem: Pain  Goal: Verbalizes/displays adequate comfort level or baseline comfort level  5/19/2024 2205 by Madie Chun, RN  Outcome: Progressing  5/19/2024 0943 by Charis Last RN  Outcome: Progressing

## 2024-05-21 LAB
AMPHET UR QL SCN: NEGATIVE
BARBITURATES UR QL SCN: NEGATIVE
BENZODIAZ UR QL: NEGATIVE
CANNABINOIDS UR QL SCN: NEGATIVE
COCAINE UR QL SCN: NEGATIVE
GLUCOSE BLD STRIP.AUTO-MCNC: 105 MG/DL (ref 70–110)
GLUCOSE BLD STRIP.AUTO-MCNC: 106 MG/DL (ref 70–110)
GLUCOSE BLD STRIP.AUTO-MCNC: 114 MG/DL (ref 70–110)
GLUCOSE BLD STRIP.AUTO-MCNC: 122 MG/DL (ref 70–110)
Lab: ABNORMAL
METHADONE UR QL: NEGATIVE
OPIATES UR QL: POSITIVE
PCP UR QL: NEGATIVE
TROPONIN I SERPL HS-MCNC: 7 NG/L (ref 0–54)
TROPONIN I SERPL HS-MCNC: 8 NG/L (ref 0–54)
TROPONIN I SERPL HS-MCNC: 8 NG/L (ref 0–54)

## 2024-05-21 PROCEDURE — G0378 HOSPITAL OBSERVATION PER HR: HCPCS

## 2024-05-21 PROCEDURE — 6360000002 HC RX W HCPCS: Performed by: HOSPITALIST

## 2024-05-21 PROCEDURE — 6370000000 HC RX 637 (ALT 250 FOR IP): Performed by: INTERNAL MEDICINE

## 2024-05-21 PROCEDURE — 2580000003 HC RX 258: Performed by: INTERNAL MEDICINE

## 2024-05-21 PROCEDURE — 97162 PT EVAL MOD COMPLEX 30 MIN: CPT

## 2024-05-21 PROCEDURE — 82962 GLUCOSE BLOOD TEST: CPT

## 2024-05-21 PROCEDURE — 96376 TX/PRO/DX INJ SAME DRUG ADON: CPT

## 2024-05-21 PROCEDURE — 6370000000 HC RX 637 (ALT 250 FOR IP): Performed by: FAMILY MEDICINE

## 2024-05-21 PROCEDURE — 80307 DRUG TEST PRSMV CHEM ANLYZR: CPT

## 2024-05-21 PROCEDURE — 97165 OT EVAL LOW COMPLEX 30 MIN: CPT

## 2024-05-21 PROCEDURE — 36415 COLL VENOUS BLD VENIPUNCTURE: CPT

## 2024-05-21 PROCEDURE — 97535 SELF CARE MNGMENT TRAINING: CPT

## 2024-05-21 PROCEDURE — 6370000000 HC RX 637 (ALT 250 FOR IP): Performed by: HOSPITALIST

## 2024-05-21 PROCEDURE — 6360000002 HC RX W HCPCS: Performed by: INTERNAL MEDICINE

## 2024-05-21 PROCEDURE — 84484 ASSAY OF TROPONIN QUANT: CPT

## 2024-05-21 RX ORDER — IBUPROFEN 600 MG/1
600 TABLET ORAL EVERY 6 HOURS PRN
Status: DISCONTINUED | OUTPATIENT
Start: 2024-05-21 | End: 2024-05-24 | Stop reason: HOSPADM

## 2024-05-21 RX ORDER — DIPHENHYDRAMINE HCL 25 MG
25 CAPSULE ORAL ONCE
Status: COMPLETED | OUTPATIENT
Start: 2024-05-21 | End: 2024-05-21

## 2024-05-21 RX ADMIN — ONDANSETRON HYDROCHLORIDE 4 MG: 2 INJECTION INTRAMUSCULAR; INTRAVENOUS at 11:35

## 2024-05-21 RX ADMIN — IBUPROFEN 600 MG: 600 TABLET, FILM COATED ORAL at 00:37

## 2024-05-21 RX ADMIN — METOPROLOL TARTRATE 25 MG: 25 TABLET, FILM COATED ORAL at 21:09

## 2024-05-21 RX ADMIN — MORPHINE SULFATE 4 MG: 4 INJECTION, SOLUTION INTRAMUSCULAR; INTRAVENOUS at 11:34

## 2024-05-21 RX ADMIN — ATORVASTATIN CALCIUM 40 MG: 20 TABLET, FILM COATED ORAL at 21:09

## 2024-05-21 RX ADMIN — LOSARTAN POTASSIUM 50 MG: 50 TABLET, FILM COATED ORAL at 07:45

## 2024-05-21 RX ADMIN — MORPHINE SULFATE 4 MG: 4 INJECTION, SOLUTION INTRAMUSCULAR; INTRAVENOUS at 17:09

## 2024-05-21 RX ADMIN — SODIUM CHLORIDE, PRESERVATIVE FREE 10 ML: 5 INJECTION INTRAVENOUS at 07:45

## 2024-05-21 RX ADMIN — DIPHENHYDRAMINE HYDROCHLORIDE 25 MG: 25 CAPSULE ORAL at 03:57

## 2024-05-21 RX ADMIN — LEVOTHYROXINE SODIUM 112 MCG: 0.07 TABLET ORAL at 06:19

## 2024-05-21 RX ADMIN — AMLODIPINE BESYLATE 10 MG: 5 TABLET ORAL at 07:45

## 2024-05-21 RX ADMIN — CLONAZEPAM 0.25 MG: 0.5 TABLET ORAL at 13:30

## 2024-05-21 RX ADMIN — KETOROLAC TROMETHAMINE 15 MG: 15 INJECTION, SOLUTION INTRAMUSCULAR; INTRAVENOUS at 13:26

## 2024-05-21 RX ADMIN — SODIUM CHLORIDE, PRESERVATIVE FREE 10 ML: 5 INJECTION INTRAVENOUS at 21:10

## 2024-05-21 RX ADMIN — ASPIRIN 81 MG: 81 TABLET, CHEWABLE ORAL at 07:45

## 2024-05-21 RX ADMIN — ENOXAPARIN SODIUM 40 MG: 100 INJECTION SUBCUTANEOUS at 08:30

## 2024-05-21 RX ADMIN — ONDANSETRON HYDROCHLORIDE 4 MG: 2 INJECTION INTRAMUSCULAR; INTRAVENOUS at 17:17

## 2024-05-21 RX ADMIN — MORPHINE SULFATE 2 MG: 2 INJECTION, SOLUTION INTRAMUSCULAR; INTRAVENOUS at 23:31

## 2024-05-21 RX ADMIN — METOPROLOL TARTRATE 25 MG: 25 TABLET, FILM COATED ORAL at 07:45

## 2024-05-21 RX ADMIN — KETOROLAC TROMETHAMINE 15 MG: 15 INJECTION, SOLUTION INTRAMUSCULAR; INTRAVENOUS at 19:52

## 2024-05-21 ASSESSMENT — PAIN DESCRIPTION - LOCATION
LOCATION: CHEST;BACK
LOCATION: BACK;CHEST
LOCATION: HEAD
LOCATION: BACK;CHEST

## 2024-05-21 ASSESSMENT — PAIN DESCRIPTION - ORIENTATION
ORIENTATION: PROXIMAL
ORIENTATION: LEFT;UPPER
ORIENTATION: LEFT;UPPER

## 2024-05-21 ASSESSMENT — PAIN DESCRIPTION - FREQUENCY: FREQUENCY: CONTINUOUS

## 2024-05-21 ASSESSMENT — PAIN SCALES - GENERAL
PAINLEVEL_OUTOF10: 9
PAINLEVEL_OUTOF10: 0
PAINLEVEL_OUTOF10: 0
PAINLEVEL_OUTOF10: 4
PAINLEVEL_OUTOF10: 8
PAINLEVEL_OUTOF10: 0
PAINLEVEL_OUTOF10: 7
PAINLEVEL_OUTOF10: 6
PAINLEVEL_OUTOF10: 0
PAINLEVEL_OUTOF10: 0

## 2024-05-21 ASSESSMENT — PAIN - FUNCTIONAL ASSESSMENT
PAIN_FUNCTIONAL_ASSESSMENT: ACTIVITIES ARE NOT PREVENTED
PAIN_FUNCTIONAL_ASSESSMENT: ACTIVITIES ARE NOT PREVENTED

## 2024-05-21 ASSESSMENT — PAIN DESCRIPTION - DESCRIPTORS
DESCRIPTORS: ACHING
DESCRIPTORS: PRESSURE

## 2024-05-21 NOTE — PROGRESS NOTES
Bedside and Verbal shift change report received from SKYE Lennon.     2115    Urine sample for urine drug screen collected and sent to Lab.     0020    Bed alarm went on for patient. SKYE Fitzgerald and GREER Morse on duty went to check on patient. Patient seen sitting on the bed, appears wobbly, seen rocking back and forth while sitting pn the edge of the bed. Patient was asked to lift arms up and noted downward drift of left arm. Patient noted to have new onset confusion as well and becoming agitated when asked to follow commands. This RN was notified of event. Code stroke called for patient. Stat blood sugar done and it was 102. Patient refusing EKG at the moment. Hospitalist in to assess patient.     0110    EKG completed for patient.     0150    Patient continues to try to get out of bed to go to the bathroom and out of the hallway without calling, noted patient to be very unsteady on her feet. Patient escorted back to bed c/o PCT on duty and this RN. Sheduled Klonopin given. Safety measures in place, bed alarm on, call bell within reach.     0155     PCT on duty found 2 vials of medications (Zolpidem and Pregabalin) on floor next to bed; pill count verified with charge RN and medications sent to Pharmacy for safekeeping. RN supervisor and hospitalist on duty made aware.    0307    Virtual  on and monitoring.     0634    Was called in room by patient. Patient appeared to have no recall of what happened earlier this morning. Tried to explain the events with her but patient becoming really defensive and verbally aggressive and expressed strong desire to go home and refuse further management until seen by her doctor. Patient refusing morning medications and accuchecks at this time. Patient taking off cardiac monitoring and putting outside clothes. RN supervisor and hospitalist on call made aware.     0650    Significant other at bedside with patient. VSC on and continuously monitoring.     0700    Patient

## 2024-05-21 NOTE — PROGRESS NOTES
Pt c/o headache 4/10, NAD noted, resting comfortably in bed. On-call paged for advil orders since pt allergic to tylenol.

## 2024-05-21 NOTE — PLAN OF CARE
Problem: Discharge Planning  Goal: Discharge to home or other facility with appropriate resources  5/20/2024 2130 by Chelly Aragon RN  Outcome: Progressing  Flowsheets (Taken 5/20/2024 1945)  Discharge to home or other facility with appropriate resources: Identify barriers to discharge with patient and caregiver  5/20/2024 1058 by Citlalli Gilbert RN  Outcome: Progressing  Flowsheets (Taken 5/20/2024 0842)  Discharge to home or other facility with appropriate resources:   Identify barriers to discharge with patient and caregiver   Arrange for needed discharge resources and transportation as appropriate     Problem: Safety - Adult  Goal: Free from fall injury  5/20/2024 2130 by Chelly Aragon RN  Outcome: Progressing  5/20/2024 1058 by Citlalli Gilbert RN  Outcome: Progressing     Problem: Pain  Goal: Verbalizes/displays adequate comfort level or baseline comfort level  5/20/2024 2130 by Chelly Aragon RN  Outcome: Progressing  Flowsheets  Taken 5/20/2024 1533 by Citlalli Gilbert RN  Verbalizes/displays adequate comfort level or baseline comfort level:   Encourage patient to monitor pain and request assistance   Administer analgesics based on type and severity of pain and evaluate response  Taken 5/20/2024 1254 by Citlalli Gilbert RN  Verbalizes/displays adequate comfort level or baseline comfort level:   Encourage patient to monitor pain and request assistance   Assess pain using appropriate pain scale  Taken 5/20/2024 1224 by Citlalli Gilbert RN  Verbalizes/displays adequate comfort level or baseline comfort level:   Encourage patient to monitor pain and request assistance   Assess pain using appropriate pain scale  Taken 5/20/2024 1118 by Citlalli Gilbert, SKYE  Verbalizes/displays adequate comfort level or baseline comfort level:   Encourage patient to monitor pain and request assistance   Assess pain using appropriate pain scale  Taken 5/20/2024 1100 by Citlalli Gilbert RN  Verbalizes/displays adequate comfort level or baseline comfort

## 2024-05-21 NOTE — PROGRESS NOTES
Bedside and Verbal shift change report given to SKYE Lennon (oncoming nurse) by SKYE Spaulding (offgoing nurse). Report included the following information Nurse Handoff Report, Index, ED Encounter Summary, ED SBAR, Adult Overview, Intake/Output, MAR, Recent Results, and Med Rec Status.

## 2024-05-21 NOTE — PROGRESS NOTES
procedures/Xrays and details which were not copied into this note but were reviewed prior to creation of Plan    Echo (TTE) complete (PRN contrast/bubble/strain/3D)    Result Date: 5/19/2024    Left Ventricle: Normal left ventricular systolic function with a visually estimated EF of 60 - 65%. Left ventricle size is normal. Normal wall thickness. Normal wall motion. Grade I diastolic dysfunction with normal LAP.   Aortic Valve: Trileaflet valve.   Mitral Valve: Mildly thickened leaflet, at the anterior leaflet.   Aorta: Normal sized ascending aorta. Mildly dilated aortic root. Ao root diameter is 3.9 cm.   Image quality is good.     CTA CHEST W WO CONTRAST    Result Date: 5/18/2024  EXAM:  CTA CHEST W WO CONTRAST INDICATION: Chest pain shortness of breath history of aneurysm. COMPARISON: None TECHNIQUE: Helical thin section chest CT following intravenous administration of nonionic contrast 100 mL of isovue 370 according to departmental PE protocol. Coronal and sagittal reformats were performed. 3D post processing was performed.  CT dose reduction was achieved through the use of a standardized protocol tailored for this examination and automatic exposure control for dose modulation. FINDINGS: This is a good quality study for the evaluation of pulmonary embolism to the first subsegmental arterial level. There is no pulmonary embolism to this level. No axillary or supraclavicular adenopathy. THYROID: No nodule. MEDIASTINUM: No mass or lymphadenopathy. CAITLIN: No mass or lymphadenopathy. THORACIC AORTA: No aneurysm. HEART: Normal in size. ESOPHAGUS: No wall thickening or dilatation. TRACHEA/BRONCHI: Patent. PLEURA: No effusion or pneumothorax. LUNGS: Bibasilar airspace opacities likely atelectasis. Emphysema UPPER ABDOMEN: Cyst in the left hepatic lobe BONES: No aggressive bone lesion or fracture.     No evidence of pulmonary embolus. No acute cardiopulmonary process. No evidence of aneurysm or dissection. Background of

## 2024-05-21 NOTE — PLAN OF CARE
Problem: Discharge Planning  Goal: Discharge to home or other facility with appropriate resources  5/21/2024 0941 by Citlalli Gilbert RN  Outcome: Progressing  Flowsheets (Taken 5/21/2024 0752)  Discharge to home or other facility with appropriate resources:   Identify barriers to discharge with patient and caregiver   Arrange for needed discharge resources and transportation as appropriate  5/20/2024 2130 by Chelly Aragon RN  Outcome: Progressing  Flowsheets (Taken 5/20/2024 1945)  Discharge to home or other facility with appropriate resources: Identify barriers to discharge with patient and caregiver     Problem: Safety - Adult  Goal: Free from fall injury  5/21/2024 0941 by Citlalli Gilbert RN  Outcome: Progressing  5/20/2024 2130 by Chelly Aragon RN  Outcome: Progressing     Problem: Pain  Goal: Verbalizes/displays adequate comfort level or baseline comfort level  5/21/2024 0941 by Citlalli Gilbert RN  Outcome: Progressing  Flowsheets (Taken 5/21/2024 0752)  Verbalizes/displays adequate comfort level or baseline comfort level:   Encourage patient to monitor pain and request assistance   Assess pain using appropriate pain scale  5/20/2024 2130 by Chelly Aragon RN  Outcome: Progressing  Flowsheets  Taken 5/20/2024 1533 by Citlalli Gilbert RN  Verbalizes/displays adequate comfort level or baseline comfort level:   Encourage patient to monitor pain and request assistance   Administer analgesics based on type and severity of pain and evaluate response  Taken 5/20/2024 1254 by Citlalli Gilbert RN  Verbalizes/displays adequate comfort level or baseline comfort level:   Encourage patient to monitor pain and request assistance   Assess pain using appropriate pain scale  Taken 5/20/2024 1224 by Citlalli Gilbert, SKYE  Verbalizes/displays adequate comfort level or baseline comfort level:   Encourage patient to monitor pain and request assistance   Assess pain using appropriate pain scale  Taken 5/20/2024 1118 by Citlalli Gilbert

## 2024-05-21 NOTE — PROGRESS NOTES
functional mobility deficits, it is recommended that the patient have 5-7 sessions per week of Physical Therapy at d/c to increase the patient's independence.  Currently, this patient demonstrates the potential endurance, and/or tolerance for 3 hours of therapy each day at d/c.    Current research shows that an AM-PAC score of 17 (13 without stairs) or less is not associated with a discharge to the patient's home setting. Based on an AM-PAC score and their current functional mobility deficits, it is recommended that the patient have 3-5 sessions per week of Physical Therapy at d/c to increase the patient's independence.     Current research shows that an AM-PAC score of 18 (14 without stairs) or greater is associated with a discharge to the patient's home setting. Based on an AM-PAC score and their current functional mobility deficits, it is recommended that the patient have 2-3 sessions per week of Physical Therapy at d/c to increase the patient's independence.    At this time and based on an AM-PAC score, no further PT is recommended upon discharge due to (i.e. patient at baseline functional status…etc…).  Recommend patient returns to prior setting with prior services.    This [facility] score should be considered in conjunction with interdisciplinary team recommendations to determine the most appropriate discharge setting. Patient's social support, diagnosis, medical stability, and prior level of function should also be taken into consideration.     SUBJECTIVE:   Patient stated “I'm tired of having this pain.”    OBJECTIVE DATA SUMMARY:     Past Medical History:   Diagnosis Date    Asthma     Hepatitis C     Multiple sclerosis (HCC)      Past Surgical History:   Procedure Laterality Date    APPENDECTOMY      HYSTERECTOMY      THYROIDECTOMY         Home Situation:  Social/Functional History  Lives With: Significant other  Type of Home: House  Home Layout: One level  Home Access: Stairs to enter with rails  Entrance  Stairs - Number of Steps: 3  Entrance Stairs - Rails: Left  Bathroom Shower/Tub: Walk-in shower  Bathroom Equipment: 3-in-1 commode, Built-in shower seat  Home Equipment: Cane, Wheelchair - Manual, Rollator, Oxygen  ADL Assistance: Independent  Active : Yes  Occupation: Part time employment, Other(comment) (\"Semi-Retired\")  Critical Behavior:  Orientation  Overall Orientation Status: Within Functional Limits  Orientation Level: Oriented X4  Cognition  Overall Cognitive Status: WFL  Strength:    Strength: Generally decreased, functional  Tone & Sensation:   Tone: Normal  Sensation: Intact  Range Of Motion:  AROM: Within functional limits  PROM: Within functional limits  Functional Mobility:  Bed Mobility:  Bed Mobility Training  Bed Mobility Training: Yes  Interventions: Safety awareness training;Tactile cues;Verbal cues  Rolling: Supervision  Supine to Sit: Stand-by assistance;Additional time  Sit to Supine: Stand-by assistance;Additional time;Supervision  Scooting: Stand-by assistance;Additional time;Supervision  Transfers:  Transfer Training  Transfer Training: Yes  Overall Level of Assistance: Minimum assistance  Interventions: Safety awareness training;Tactile cues;Verbal cues  Sit to Stand: Contact-guard assistance;Additional time;Minimum assistance  Stand to Sit: Contact-guard assistance;Additional time;Minimum assistance  Toilet Transfer: Minimum assistance  Balance:   Balance  Sitting: Impaired  Sitting - Static: Fair (occasional)  Sitting - Dynamic: Fair (occasional);Occasional  Standing: Impaired  Standing - Static: Fair;Occasional  Standing - Dynamic: Occasional;Constant support  Wheelchair Mobility:  Wheelchair Management  Wheelchair Management: No  Ambulation/Gait Training:  Gait  Gait Training: No  Assistive Device: Gait belt;Walker, rolling  Interventions: Verbal cues;Safety awareness training;Visual cues;Manual cues  Therapeutic Exercises/Neuromuscular Re-education:     Pain:  Pain level

## 2024-05-21 NOTE — PROGRESS NOTES
0332:  In patient room to view rash to left upper back.  Red, raised bumps noted to left shoulder area.  No noted rash to any other areas.  Patient states, \"It burns, more than it itches.\"  Contacted Dr. Jones via [employer] for Primary RN.      0342:  Orders received for one time dose of Benadryl.  Primary RN notified.

## 2024-05-21 NOTE — PROGRESS NOTES
Occupational Therapy Goals:  Initiated 5/21/2024 to be met within 7-10 days.  Short Term Goals  Time Frame for Short Term Goals: 7 days  Short Term Goal 1: Patient will complete toilet transfer with mod I  Short Term Goal 2: Patient will complete bathing with mod I  Short Term Goal 3: Patient will complete grooming in standing at sink with mod I  Short Term Goal 4: Patient will complete dressing with mod I  Short Term Goal 5: Patient will complete toileting with mod I    OCCUPATIONAL THERAPY EVALUATION    Patient: India Thao (64 y.o. female)  Date: 5/21/2024  Primary Diagnosis: Angina pectoris (HCC) [I20.9]  O2 dependent [Z99.81]  Acute coronary syndrome (HCC) [I24.9]  Atypical chest pain [R07.89]  History of COPD [Z87.09]  Chest pain, unspecified type [R07.9]       Precautions: Fall Risk,  ,  ,  ,  ,  ,  ,    PLOF: Patient completed ADLs independently and functional mobility at the modified independent level with rollator in community and furniture walks at home    ASSESSMENT :  Patient presented seated EOB with cardiac monitor. Patient with visitor present for entire session. Patient reports she is on the way to the bathroom.Patient completed grooming contact guard assist at sink for hand hygiene, lower body dressing supervision for sock and shoe don, using figure four positioning and patient only wears slip on shoes d/t decreased FMC, toileting contact guard assist, and toilet transfer contact guard assist with RW. Patient with decreased stability with functional mobility, did report \"some\" lightheadedness after return to bed.  Patient completed assessment of ADLs and BUE strength, ROM (see details below). Due to deficits (listed below) patient has decreased independence with ADLs and functional mobility and may benefit from SNF, however patient did not seem receptive to rehab at this time.  ,      DEFICITS/IMPAIRMENTS:  Performance deficits / Impairments: Decreased functional mobility ;Decreased

## 2024-05-22 PROBLEM — R07.9 CHEST PAIN: Status: ACTIVE | Noted: 2024-05-22

## 2024-05-22 LAB
ALBUMIN SERPL-MCNC: 3.5 G/DL (ref 3.4–5)
ALBUMIN/GLOB SERPL: 1.4 (ref 0.8–1.7)
ALP SERPL-CCNC: 143 U/L (ref 45–117)
ALT SERPL-CCNC: 83 U/L (ref 13–56)
ANION GAP SERPL CALC-SCNC: 5 MMOL/L (ref 3–18)
AST SERPL-CCNC: 88 U/L (ref 10–38)
BASOPHILS # BLD: 0.1 K/UL (ref 0–0.1)
BASOPHILS NFR BLD: 2 % (ref 0–2)
BILIRUB SERPL-MCNC: 0.7 MG/DL (ref 0.2–1)
BUN SERPL-MCNC: 13 MG/DL (ref 7–18)
BUN/CREAT SERPL: 15 (ref 12–20)
CALCIUM SERPL-MCNC: 9.3 MG/DL (ref 8.5–10.1)
CHLORIDE SERPL-SCNC: 104 MMOL/L (ref 100–111)
CO2 SERPL-SCNC: 31 MMOL/L (ref 21–32)
CREAT SERPL-MCNC: 0.88 MG/DL (ref 0.6–1.3)
DIFFERENTIAL METHOD BLD: ABNORMAL
EOSINOPHIL # BLD: 0.3 K/UL (ref 0–0.4)
EOSINOPHIL NFR BLD: 4 % (ref 0–5)
ERYTHROCYTE [DISTWIDTH] IN BLOOD BY AUTOMATED COUNT: 20.7 % (ref 11.6–14.5)
GLOBULIN SER CALC-MCNC: 2.5 G/DL (ref 2–4)
GLUCOSE BLD STRIP.AUTO-MCNC: 102 MG/DL (ref 70–110)
GLUCOSE BLD STRIP.AUTO-MCNC: 106 MG/DL (ref 70–110)
GLUCOSE BLD STRIP.AUTO-MCNC: 143 MG/DL (ref 70–110)
GLUCOSE SERPL-MCNC: 116 MG/DL (ref 74–99)
HCT VFR BLD AUTO: 39 % (ref 35–45)
HGB BLD-MCNC: 11.6 G/DL (ref 12–16)
IMM GRANULOCYTES # BLD AUTO: 0 K/UL (ref 0–0.04)
IMM GRANULOCYTES NFR BLD AUTO: 0 % (ref 0–0.5)
INR PPP: 1 (ref 0.9–1.1)
LYMPHOCYTES # BLD: 2.1 K/UL (ref 0.9–3.6)
LYMPHOCYTES NFR BLD: 30 % (ref 21–52)
MCH RBC QN AUTO: 21.9 PG (ref 24–34)
MCHC RBC AUTO-ENTMCNC: 29.7 G/DL (ref 31–37)
MCV RBC AUTO: 73.6 FL (ref 78–100)
MONOCYTES # BLD: 0.7 K/UL (ref 0.05–1.2)
MONOCYTES NFR BLD: 10 % (ref 3–10)
NEUTS SEG # BLD: 3.8 K/UL (ref 1.8–8)
NEUTS SEG NFR BLD: 54 % (ref 40–73)
NRBC # BLD: 0 K/UL (ref 0–0.01)
NRBC BLD-RTO: 0 PER 100 WBC
PLATELET # BLD AUTO: 181 K/UL (ref 135–420)
PMV BLD AUTO: 10.7 FL (ref 9.2–11.8)
POTASSIUM SERPL-SCNC: 4.6 MMOL/L (ref 3.5–5.5)
PROT SERPL-MCNC: 6 G/DL (ref 6.4–8.2)
PROTHROMBIN TIME: 13.6 SEC (ref 11.9–14.7)
RBC # BLD AUTO: 5.3 M/UL (ref 4.2–5.3)
SODIUM SERPL-SCNC: 140 MMOL/L (ref 136–145)
TROPONIN I SERPL HS-MCNC: 6 NG/L (ref 0–54)
TROPONIN I SERPL HS-MCNC: 7 NG/L (ref 0–54)
WBC # BLD AUTO: 7.1 K/UL (ref 4.6–13.2)

## 2024-05-22 PROCEDURE — 36415 COLL VENOUS BLD VENIPUNCTURE: CPT

## 2024-05-22 PROCEDURE — C1894 INTRO/SHEATH, NON-LASER: HCPCS | Performed by: INTERNAL MEDICINE

## 2024-05-22 PROCEDURE — 85610 PROTHROMBIN TIME: CPT

## 2024-05-22 PROCEDURE — 6370000000 HC RX 637 (ALT 250 FOR IP): Performed by: INTERNAL MEDICINE

## 2024-05-22 PROCEDURE — 6370000000 HC RX 637 (ALT 250 FOR IP): Performed by: HOSPITALIST

## 2024-05-22 PROCEDURE — 96376 TX/PRO/DX INJ SAME DRUG ADON: CPT

## 2024-05-22 PROCEDURE — 99153 MOD SED SAME PHYS/QHP EA: CPT | Performed by: INTERNAL MEDICINE

## 2024-05-22 PROCEDURE — 84484 ASSAY OF TROPONIN QUANT: CPT

## 2024-05-22 PROCEDURE — 93458 L HRT ARTERY/VENTRICLE ANGIO: CPT | Performed by: INTERNAL MEDICINE

## 2024-05-22 PROCEDURE — 6360000002 HC RX W HCPCS: Performed by: INTERNAL MEDICINE

## 2024-05-22 PROCEDURE — 99152 MOD SED SAME PHYS/QHP 5/>YRS: CPT | Performed by: INTERNAL MEDICINE

## 2024-05-22 PROCEDURE — 4A023N7 MEASUREMENT OF CARDIAC SAMPLING AND PRESSURE, LEFT HEART, PERCUTANEOUS APPROACH: ICD-10-PCS | Performed by: INTERNAL MEDICINE

## 2024-05-22 PROCEDURE — 6360000004 HC RX CONTRAST MEDICATION: Performed by: INTERNAL MEDICINE

## 2024-05-22 PROCEDURE — 6370000000 HC RX 637 (ALT 250 FOR IP): Performed by: FAMILY MEDICINE

## 2024-05-22 PROCEDURE — 80053 COMPREHEN METABOLIC PANEL: CPT

## 2024-05-22 PROCEDURE — 2709999900 HC NON-CHARGEABLE SUPPLY: Performed by: INTERNAL MEDICINE

## 2024-05-22 PROCEDURE — 2580000003 HC RX 258: Performed by: INTERNAL MEDICINE

## 2024-05-22 PROCEDURE — 93005 ELECTROCARDIOGRAM TRACING: CPT | Performed by: INTERNAL MEDICINE

## 2024-05-22 PROCEDURE — B2111ZZ FLUOROSCOPY OF MULTIPLE CORONARY ARTERIES USING LOW OSMOLAR CONTRAST: ICD-10-PCS | Performed by: INTERNAL MEDICINE

## 2024-05-22 PROCEDURE — 1100000003 HC PRIVATE W/ TELEMETRY

## 2024-05-22 PROCEDURE — 82962 GLUCOSE BLOOD TEST: CPT

## 2024-05-22 PROCEDURE — 85025 COMPLETE CBC W/AUTO DIFF WBC: CPT

## 2024-05-22 PROCEDURE — 2500000003 HC RX 250 WO HCPCS: Performed by: INTERNAL MEDICINE

## 2024-05-22 RX ORDER — MIDAZOLAM HYDROCHLORIDE 1 MG/ML
INJECTION INTRAMUSCULAR; INTRAVENOUS PRN
Status: DISCONTINUED | OUTPATIENT
Start: 2024-05-22 | End: 2024-05-22 | Stop reason: HOSPADM

## 2024-05-22 RX ORDER — HEPARIN SODIUM 1000 [USP'U]/ML
INJECTION, SOLUTION INTRAVENOUS; SUBCUTANEOUS PRN
Status: DISCONTINUED | OUTPATIENT
Start: 2024-05-22 | End: 2024-05-22 | Stop reason: HOSPADM

## 2024-05-22 RX ORDER — HEPARIN SODIUM 200 [USP'U]/100ML
INJECTION, SOLUTION INTRAVENOUS CONTINUOUS PRN
Status: COMPLETED | OUTPATIENT
Start: 2024-05-22 | End: 2024-05-22

## 2024-05-22 RX ORDER — SODIUM CHLORIDE 9 MG/ML
INJECTION, SOLUTION INTRAVENOUS PRN
Status: DISCONTINUED | OUTPATIENT
Start: 2024-05-22 | End: 2024-05-24 | Stop reason: HOSPADM

## 2024-05-22 RX ORDER — SODIUM CHLORIDE 0.9 % (FLUSH) 0.9 %
5-40 SYRINGE (ML) INJECTION PRN
Status: DISCONTINUED | OUTPATIENT
Start: 2024-05-22 | End: 2024-05-24 | Stop reason: HOSPADM

## 2024-05-22 RX ORDER — VERAPAMIL HYDROCHLORIDE 2.5 MG/ML
INJECTION, SOLUTION INTRAVENOUS PRN
Status: DISCONTINUED | OUTPATIENT
Start: 2024-05-22 | End: 2024-05-22 | Stop reason: HOSPADM

## 2024-05-22 RX ORDER — SODIUM CHLORIDE 0.9 % (FLUSH) 0.9 %
5-40 SYRINGE (ML) INJECTION EVERY 12 HOURS SCHEDULED
Status: DISCONTINUED | OUTPATIENT
Start: 2024-05-22 | End: 2024-05-24 | Stop reason: HOSPADM

## 2024-05-22 RX ORDER — LIDOCAINE HYDROCHLORIDE 10 MG/ML
INJECTION, SOLUTION INFILTRATION; PERINEURAL PRN
Status: DISCONTINUED | OUTPATIENT
Start: 2024-05-22 | End: 2024-05-22 | Stop reason: HOSPADM

## 2024-05-22 RX ADMIN — SODIUM CHLORIDE, PRESERVATIVE FREE 10 ML: 5 INJECTION INTRAVENOUS at 08:39

## 2024-05-22 RX ADMIN — SODIUM CHLORIDE, PRESERVATIVE FREE 10 ML: 5 INJECTION INTRAVENOUS at 20:35

## 2024-05-22 RX ADMIN — LOSARTAN POTASSIUM 50 MG: 50 TABLET, FILM COATED ORAL at 08:26

## 2024-05-22 RX ADMIN — METOPROLOL TARTRATE 25 MG: 25 TABLET, FILM COATED ORAL at 08:27

## 2024-05-22 RX ADMIN — MORPHINE SULFATE 2 MG: 2 INJECTION, SOLUTION INTRAMUSCULAR; INTRAVENOUS at 08:27

## 2024-05-22 RX ADMIN — HYDROXYZINE HYDROCHLORIDE 50 MG: 25 TABLET, FILM COATED ORAL at 20:31

## 2024-05-22 RX ADMIN — AMLODIPINE BESYLATE 10 MG: 5 TABLET ORAL at 08:27

## 2024-05-22 RX ADMIN — IBUPROFEN 600 MG: 600 TABLET, FILM COATED ORAL at 20:32

## 2024-05-22 RX ADMIN — ZOLPIDEM TARTRATE 5 MG: 5 TABLET ORAL at 20:32

## 2024-05-22 RX ADMIN — ATORVASTATIN CALCIUM 40 MG: 20 TABLET, FILM COATED ORAL at 20:32

## 2024-05-22 RX ADMIN — MORPHINE SULFATE 2 MG: 2 INJECTION, SOLUTION INTRAMUSCULAR; INTRAVENOUS at 21:23

## 2024-05-22 RX ADMIN — CLONAZEPAM 0.25 MG: 0.5 TABLET ORAL at 01:50

## 2024-05-22 RX ADMIN — ASPIRIN 81 MG: 81 TABLET, CHEWABLE ORAL at 08:27

## 2024-05-22 RX ADMIN — ONDANSETRON HYDROCHLORIDE 4 MG: 2 INJECTION INTRAMUSCULAR; INTRAVENOUS at 14:59

## 2024-05-22 RX ADMIN — METOPROLOL TARTRATE 25 MG: 25 TABLET, FILM COATED ORAL at 20:32

## 2024-05-22 ASSESSMENT — PAIN - FUNCTIONAL ASSESSMENT: PAIN_FUNCTIONAL_ASSESSMENT: 0-10

## 2024-05-22 ASSESSMENT — PAIN DESCRIPTION - ORIENTATION
ORIENTATION: LEFT
ORIENTATION: LEFT

## 2024-05-22 ASSESSMENT — PAIN DESCRIPTION - LOCATION
LOCATION: ARM;BACK;CHEST
LOCATION: CHEST
LOCATION: CHEST

## 2024-05-22 ASSESSMENT — PAIN SCALES - GENERAL
PAINLEVEL_OUTOF10: 4
PAINLEVEL_OUTOF10: 7
PAINLEVEL_OUTOF10: 7
PAINLEVEL_OUTOF10: 6

## 2024-05-22 ASSESSMENT — PAIN DESCRIPTION - DESCRIPTORS: DESCRIPTORS: ACHING

## 2024-05-22 NOTE — PRE SEDATION
Sedation Plan  ASA: class 3 - patient with severe systemic disease     Mallampati class: II - soft palate, uvula, fauces visible.    Sedation plan: local anesthesia and moderate (conscious sedation)    Risks, benefits, and alternatives discussed with patient.  Use of blood products discussed with patient who consented to blood products.       Immediate reassessment prior to sedation:  Patient's status reviewed and vital signs assessed; acceptable to perform procedure and proceed to administer sedation as planned.     solids

## 2024-05-22 NOTE — DISCHARGE INSTRUCTIONS
Cardiac Catheterization/Angiography Discharge Instructions    *Check the puncture site frequently for swelling or bleeding. If you see any bleeding, lie down and apply pressure over the area with a clean town or washcloth. Notify your doctor for any redness, swelling, drainage or oozing from the puncture site. Notify your doctor for any fever or chills.    *If the leg or arm with the puncture becomes cold, numb or painful, call Dr *** at  ***    *Activity should be limited for the next 48 hours. Climb stairs as little as possible and avoid any stooping, bending or strenuous activity for 48 hours. No heavy lifting (anything over 10 pounds) for three days.    *Do not drive for 48 hours.    *You may resume your usual diet. Drink more fluids than usual.    *Have a responsible person drive you home and stay with you for at least 24 hours after your heart catheterization/angiography.    *You may remove the bandage from your {ARM/GROIN:24985}in 24 hours. You may shower in 24 hours. No tub baths, hot tubs or swimming for one week. Do not place any lotions, creams, powders, ointments over the puncture site for one week. You may place a clean band-aid over the puncture site each day for 5 days. Change this daily.

## 2024-05-22 NOTE — PLAN OF CARE
Problem: Safety - Adult  Goal: Free from fall injury  Outcome: Progressing     Problem: Pain  Goal: Verbalizes/displays adequate comfort level or baseline comfort level  Outcome: Progressing  Flowsheets (Taken 5/21/2024 1507 by Citlalli Gilbert RN)  Verbalizes/displays adequate comfort level or baseline comfort level:   Encourage patient to monitor pain and request assistance   Assess pain using appropriate pain scale     Problem: Chronic Conditions and Co-morbidities  Goal: Patient's chronic conditions and co-morbidity symptoms are monitored and maintained or improved  Outcome: Progressing

## 2024-05-22 NOTE — PROGRESS NOTES
5/22/2024 PT note: consult received and chart reviewed.  Treatment attempted. Pt currently off unit at cath lab.  Will f/u at later time as pt schedule allows. Thank you.   Yamileth, PT    2nd attempt: As noted above. Will f/up in the am. Thank you.  Yamileth,PT

## 2024-05-22 NOTE — POST SEDATION
Sedation Post Procedure Note    Patient Name: India Thao   YOB: 1960  Room/Bed: [facility]/  Medical Record Number: [id number]  Date: 5/22/2024   Time: 6:17 PM         Physicians/Assistants: Patricia Smith MD, MD    Procedure Performed:  LHC, coronary angiogram    Post-Sedation Vital Signs:  Vitals:    05/22/24 1744   BP: (!) 154/78   Pulse: 71   Resp: 11   Temp:    SpO2: 94%      Vital signs were reviewed and were stable after the procedure (see flow sheet for vitals)            Post-Sedation Exam: Lungs: clear           Complications: none    Electronically signed by Patricia Smith MD on 5/22/2024 at 6:17 PM

## 2024-05-22 NOTE — PROGRESS NOTES
Radial band removed, no bleeding observed, small bruising at the site. Patient tolerated removal well. Arm board remains in use.

## 2024-05-22 NOTE — BRIEF OP NOTE
Brief Postoperative Note      Patient: India Thao  YOB: 1960  MRN: [id number]    Date of Procedure: 5/22/2024    Pre-Op Diagnosis Codes:     * Chest pain [R07.9]    Post-Op Diagnosis: CAD       Procedure(s):  Left heart cath / coronary angiography    Surgeon(s):  Patricia Smith MD    Assistant:  * No surgical staff found *    Anesthesia: IV Sedation    Estimated Blood Loss (mL): Minimal    Complications: None    Specimens:   * No specimens in log *    Implants:  * No implants in log *      Drains: * No LDAs found *    Findings:  Infection Present At Time Of Surgery (PATOS) (choose all levels that have infection present):  No infection present  Other Findings:   LHC and Coronary angiogram done via left radial approach.     Coronary angiogram revealed mild CAD    LV gram was not done. LVEDP 12 mm hg. No significant gradient on pull back.     Patient tolerated procedure well.     Scant blood loss.  No complications.  No specimen removed.     Recommendation:    Medication considered:   Aspirin, beta blocker, ACEI, Nitrates and statin     CAD risk factor education  Diet education  Control cholesterol  Blood pressure control  Exercise education: Age and functional status appropriate.  Consider evaluation for other sources of reported symptoms.    No driving for 24 hours   No weight lifting no more than 10 lb from left hand for 1 week   Okay to discharge from cardiac standpoint tomorrow   Follow-up with primary cardiologist in 6 weeks after discharge.    Electronically signed by Patricia Smith MD on 5/22/2024 at 6:18 PM

## 2024-05-22 NOTE — PROGRESS NOTES
TRANSFER - OUT REPORT:    Verbal report given to Edmond RN on India Thao  being transferred to [address] [place] [address] for routine progression of patient care       Report consisted of patient’s Situation, Background, Assessment and   Recommendations(SBAR).     Information from the following report(s) Nurse Handoff Report was reviewed with the receiving nurse.    Opportunity for questions and clarification was provided.      Patient transported with:   Monitor         This SmartLink retrieves the last documented value for LDA assessment data, retrieved by either LDA type or by LDA group ID.     This SmartLink should be used in a SmartText or SmartPhrase. If one is not available, please contact your .

## 2024-05-22 NOTE — PROGRESS NOTES
Patient was seen earlier for complaint of chest pain, reproducible, and felt musculoskeletal related to recent fall and chest injury.  Noted confused by the nurse and agitated, and code stroke was called.  Suspect related to morphine 2 mg x 2 doses she received.    She appears anxious, and has no focal weakness on exam and no slurred speech.  No clinical suspicion for acute stroke.  Will continue monitor and request a sitter.

## 2024-05-22 NOTE — INTERVAL H&P NOTE
Update History & Physical    The patient's History and Physical of May 22, 2024 was reviewed with the patient and I examined the patient. There was no change. The surgical site was confirmed by the patient and me.     Plan: The risks, benefits, expected outcome, and alternative to the recommended procedure have been discussed with the patient. Patient understands and wants to proceed with the procedure.     Electronically signed by Patricia Smith MD on 5/22/2024 at 4:48 PM

## 2024-05-22 NOTE — PROGRESS NOTES
Patient returned from Cath Procedure. Patient is alert and oriented x 4.  She was educated on not using left hand.  Arm board remains on left arm. Radial band is in place with no signs of bleeding.  Patient was offered food and drink.  She declines food at this time, and was provided a ginger ale to drink.

## 2024-05-22 NOTE — PROGRESS NOTES
Hospitalist Progress Note-critical care note     Patient: India Thao MRN: [id number]  CSN: [id number]    YOB: 1960  Age: 64 y.o.  Sex: female    DOA: 5/18/2024 LOS:  LOS: 0 days            Chief complaint: chest pain , emphysema , htn     Assessment/Plan         Active Hospital Problems    Diagnosis Date Noted    Chest pain [R07.9] 05/22/2024    Atypical chest pain [R07.89] 05/18/2024    Chronic hypoxic respiratory failure (HCC) [J96.11] 05/18/2024    Centrilobular emphysema (HCC) [J43.2] 05/18/2024    Anxiety [F41.9] 05/18/2024    Inadequately controlled diabetes mellitus (HCC) [E11.65] 05/18/2024    Hypothyroidism [E03.9] 05/13/2020    Hypertension [I10] 05/13/2020    Hypercholesterolemia [E78.00] 05/13/2020    Chronic hepatitis C (HCC) [B18.2] 05/13/2020       Atypical chest pain.  Still having chest pain not sure etiologist at this point -not improving per 4 mg morphine , toradol prn   Lidocaine patch , klonopin for anxiety   -Observation with telemetry monitoring.  On -Aspirin 81 mg daily.  -Empiric statin therapy.  -Metoprolol 25 mg p.o. twice daily.  -Echocardiogram.  -Lexiscan stress test done negative for ischemic change-will have cath today   -Cardiology following  Cta chest no PE , trop wnl   Uds       COPD.  -Stable.  -Continue bronchodilators and supplemental oxygen.       Chronic hypoxic respiratory failure.  -Continue customary supplemental oxygen.     Hypertension uncontrolled.   -Initiate metoprolol.increase norvasc   -Monitor blood pressure.  May need more control than this.  -Low-sodium heart healthy diet.      Type 2 diabetes.  -Diabetes surveillance.  -Humalog sliding scale.  -Hemoglobin A1c     Hyperlipidemia.  -Assess lipid status     History of hepatitis C.  -History noted    History of multiple sclerosis.  -History noted.        Subjective feel better now, still in pain   Family member were at the bedside   TIME: E/M Time spent with patient and patient care issues: []

## 2024-05-22 NOTE — PROGRESS NOTES
Cardiology Progress Note        Patient: India Thao        Sex: female          DOA: 5/18/2024  YOB: 1960      Age:  64 y.o.        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 Left ventricle size is normal. Normal wall thickness. Normal wall motion. Grade I diastolic dysfunction with normal LAP.    Aortic Valve: Trileaflet valve.    Mitral Valve: Mildly thickened leaflet, at the anterior leaflet.    Aorta: Normal sized ascending aorta. Mildly dilated aortic root. Ao root diameter is 3.9 cm.    Image quality is good.     Lexiscan stress test reported    Negative pharmacological Lexiscan nuclear stress test for detection of ischemia.  Resting EKG sinus rhythm with nonspecific ST and T changes.  Nondiagnostic EKG changes during pharmacological stress test.  No arrhythmia during the stress test.  Nuclear images are compromised with high uptake of radioisotope in intestine close to the inferior wall.  Overall no evidence of ischemia or infarction.  Ejection fraction is at 61%    Plan:     Continue aspirin, amlodipine and metoprolol   Continue telemetry monitor    In view of continued precordial chest pain suggestive of unstable angina advised about left heart                Lab/Data Reviewed:       Recent Labs     05/22/24  0445   WBC 7.1   HGB 11.6*   HCT 39.0          Recent Labs     05/22/24  0445      K 4.6      CO2 31   BUN 13         Signed By: Patricia Smith MD     May 22, 2024

## 2024-05-22 NOTE — PROGRESS NOTES
Cardiology Progress Note        Patient: India Thao        Sex: female          DOA: 5/18/2024  YOB: 1960      Age:  64 y.o.        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 Left ventricle size is normal. Normal wall thickness. Normal wall motion. Grade I diastolic dysfunction with normal LAP.    Aortic Valve: Trileaflet valve.    Mitral Valve: Mildly thickened leaflet, at the anterior leaflet.    Aorta: Normal sized ascending aorta. Mildly dilated aortic root. Ao root diameter is 3.9 cm.    Image quality is good.     Lexiscan stress test reported    Negative pharmacological Lexiscan nuclear stress test for detection of ischemia.  Resting EKG sinus rhythm with nonspecific ST and T changes.  Nondiagnostic EKG changes during pharmacological stress test.  No arrhythmia during the stress test.  Nuclear images are compromised with high uptake of radioisotope in intestine close to the inferior wall.  Overall no evidence of ischemia or infarction.  Ejection fraction is at 61%    Plan:     Continue aspirin, amlodipine and metoprolol   Continue telemetry monitor   Patient will go for cardiac catheterization tomorrow    NPO after midnight            Subjective:    cc:    Complaining of left-sided chest pain off and on lasts for 15-20 minutes      REVIEW OF SYSTEMS:     General: No fevers or chills.  Cardiovascular:  positive chest pain,No palpitations, No orthopnea, No PND, No leg swelling, No claudication  Pulmonary: No  dyspnea   Gastrointestinal: No nausea, vomiting, bleeding  Neurology: No Dizziness    Objective:      Visit Vitals  BP (!) 151/92   Pulse 71   Temp 97 °F (36.1 °C) (Temporal)   Resp 17   Ht 1.651 m (5' 5\")   Wt 87 kg (191 lb 12.8 oz)   SpO2 98%   BMI 31.92 kg/m²     Body mass index is 31.92 kg/m².    Physical Exam:  General Appearance: Comfortable, not using accessory muscles of respiration.  HEENT: JOSE C.   HEAD: Atraumatic  NECK: No JVD, no thyroidomeglay. CAROTIDS: no bruit  LUNGS: Clear bilaterally.   HEART: S1+S2 audible, no murmur, no pericardial rub.     ABD: Non-tender, BS Audible    EXT: No edema, and no cyanosis.  VASCULAR EXAM: Pulses are intact.    PSYCHIATRIC EXAM: Mood is appropriate.  MUSCULOSKELETAL: Grossly no joint deformity.  NEUROLOGICAL: AAO times 3, No motor and sensory deficit    Medication:  Current Facility-Administered Medications   Medication Dose Route Frequency    ibuprofen (ADVIL;MOTRIN) tablet 600 mg  600 mg Oral Q6H PRN    hydrALAZINE (APRESOLINE) injection 10 mg  10 mg IntraVENous Q6H PRN    amLODIPine (NORVASC) tablet 10 mg  10 mg Oral Daily    ketorolac (TORADOL) injection 15 mg  15 mg IntraVENous Q6H PRN    lidocaine 4 % external patch 1 patch  1 patch TransDERmal Daily    clonazePAM (KLONOPIN) tablet 0.25 mg  0.25 mg Oral Q12H    losartan (COZAAR) tablet 50 mg  50 mg Oral Daily    levothyroxine (SYNTHROID) tablet 112 mcg  112 mcg Oral Daily    sodium chloride flush 0.9 % injection 5-40 mL  5-40 mL IntraVENous 2 times per day    sodium chloride flush 0.9 % injection 5-40 mL  5-40 mL IntraVENous PRN    0.9 % sodium chloride infusion   IntraVENous PRN    potassium chloride (KLOR-CON M) extended release tablet 40 mEq  40 mEq Oral

## 2024-05-22 NOTE — PROGRESS NOTES
Patient arrived to cath lab alert and oriented.  She states her pain to be an 8/10 and complains of nausea. PRN zofran provided. Patient prepped and ready for procedure.

## 2024-05-23 ENCOUNTER — APPOINTMENT (OUTPATIENT)
Facility: HOSPITAL | Age: 64
DRG: 383 | End: 2024-05-23
Payer: MEDICAID

## 2024-05-23 PROBLEM — B02.9 SHINGLES: Status: ACTIVE | Noted: 2024-05-23

## 2024-05-23 PROBLEM — K59.00 CONSTIPATION: Status: ACTIVE | Noted: 2024-05-23

## 2024-05-23 LAB
EKG ATRIAL RATE: 63 BPM
EKG ATRIAL RATE: 69 BPM
EKG ATRIAL RATE: 76 BPM
EKG DIAGNOSIS: NORMAL
EKG P AXIS: 49 DEGREES
EKG P AXIS: 50 DEGREES
EKG P AXIS: 70 DEGREES
EKG P-R INTERVAL: 168 MS
EKG P-R INTERVAL: 198 MS
EKG P-R INTERVAL: 200 MS
EKG Q-T INTERVAL: 396 MS
EKG Q-T INTERVAL: 396 MS
EKG Q-T INTERVAL: 422 MS
EKG QRS DURATION: 80 MS
EKG QRS DURATION: 80 MS
EKG QRS DURATION: 86 MS
EKG QTC CALCULATION (BAZETT): 424 MS
EKG QTC CALCULATION (BAZETT): 431 MS
EKG QTC CALCULATION (BAZETT): 445 MS
EKG R AXIS: -1 DEGREES
EKG R AXIS: -8 DEGREES
EKG R AXIS: 7 DEGREES
EKG T AXIS: 54 DEGREES
EKG T AXIS: 63 DEGREES
EKG T AXIS: 86 DEGREES
EKG VENTRICULAR RATE: 63 BPM
EKG VENTRICULAR RATE: 69 BPM
EKG VENTRICULAR RATE: 76 BPM
GLUCOSE BLD STRIP.AUTO-MCNC: 100 MG/DL (ref 70–110)
GLUCOSE BLD STRIP.AUTO-MCNC: 113 MG/DL (ref 70–110)
GLUCOSE BLD STRIP.AUTO-MCNC: 136 MG/DL (ref 70–110)
GLUCOSE BLD STRIP.AUTO-MCNC: 138 MG/DL (ref 70–110)

## 2024-05-23 PROCEDURE — 6360000002 HC RX W HCPCS: Performed by: INTERNAL MEDICINE

## 2024-05-23 PROCEDURE — 6370000000 HC RX 637 (ALT 250 FOR IP): Performed by: FAMILY MEDICINE

## 2024-05-23 PROCEDURE — 6370000000 HC RX 637 (ALT 250 FOR IP): Performed by: INTERNAL MEDICINE

## 2024-05-23 PROCEDURE — 1100000003 HC PRIVATE W/ TELEMETRY

## 2024-05-23 PROCEDURE — 6370000000 HC RX 637 (ALT 250 FOR IP): Performed by: HOSPITALIST

## 2024-05-23 PROCEDURE — 74018 RADEX ABDOMEN 1 VIEW: CPT

## 2024-05-23 PROCEDURE — 82962 GLUCOSE BLOOD TEST: CPT

## 2024-05-23 PROCEDURE — 2580000003 HC RX 258: Performed by: INTERNAL MEDICINE

## 2024-05-23 PROCEDURE — 6360000002 HC RX W HCPCS: Performed by: HOSPITALIST

## 2024-05-23 RX ORDER — MORPHINE SULFATE 2 MG/ML
1 INJECTION, SOLUTION INTRAMUSCULAR; INTRAVENOUS EVERY 4 HOURS PRN
Status: DISCONTINUED | OUTPATIENT
Start: 2024-05-23 | End: 2024-05-24 | Stop reason: HOSPADM

## 2024-05-23 RX ORDER — ACYCLOVIR 200 MG/1
400 CAPSULE ORAL 4 TIMES DAILY
Status: DISCONTINUED | OUTPATIENT
Start: 2024-05-23 | End: 2024-05-24 | Stop reason: HOSPADM

## 2024-05-23 RX ORDER — SENNOSIDES A AND B 8.6 MG/1
1 TABLET, FILM COATED ORAL 2 TIMES DAILY
Status: DISCONTINUED | OUTPATIENT
Start: 2024-05-23 | End: 2024-05-24 | Stop reason: HOSPADM

## 2024-05-23 RX ORDER — POLYETHYLENE GLYCOL 3350 17 G/17G
17 POWDER, FOR SOLUTION ORAL 2 TIMES DAILY
Status: DISCONTINUED | OUTPATIENT
Start: 2024-05-23 | End: 2024-05-24 | Stop reason: HOSPADM

## 2024-05-23 RX ORDER — ACYCLOVIR 200 MG/1
400 CAPSULE ORAL 4 TIMES DAILY
Status: DISCONTINUED | OUTPATIENT
Start: 2024-05-23 | End: 2024-05-23

## 2024-05-23 RX ADMIN — ATORVASTATIN CALCIUM 40 MG: 20 TABLET, FILM COATED ORAL at 21:42

## 2024-05-23 RX ADMIN — LOSARTAN POTASSIUM 50 MG: 50 TABLET, FILM COATED ORAL at 12:13

## 2024-05-23 RX ADMIN — ONDANSETRON HYDROCHLORIDE 4 MG: 2 INJECTION INTRAMUSCULAR; INTRAVENOUS at 05:25

## 2024-05-23 RX ADMIN — ACYCLOVIR 400 MG: 200 CAPSULE ORAL at 12:12

## 2024-05-23 RX ADMIN — CLONAZEPAM 0.25 MG: 0.5 TABLET ORAL at 15:44

## 2024-05-23 RX ADMIN — KETOROLAC TROMETHAMINE 15 MG: 15 INJECTION, SOLUTION INTRAMUSCULAR; INTRAVENOUS at 21:43

## 2024-05-23 RX ADMIN — HYDROXYZINE HYDROCHLORIDE 50 MG: 25 TABLET, FILM COATED ORAL at 12:13

## 2024-05-23 RX ADMIN — SENNOSIDES 8.6 MG: 8.6 TABLET, FILM COATED ORAL at 21:42

## 2024-05-23 RX ADMIN — METOPROLOL TARTRATE 25 MG: 25 TABLET, FILM COATED ORAL at 21:41

## 2024-05-23 RX ADMIN — Medication 12.5 MG: at 18:46

## 2024-05-23 RX ADMIN — SODIUM CHLORIDE, PRESERVATIVE FREE 10 ML: 5 INJECTION INTRAVENOUS at 21:40

## 2024-05-23 RX ADMIN — ENOXAPARIN SODIUM 40 MG: 100 INJECTION SUBCUTANEOUS at 15:55

## 2024-05-23 RX ADMIN — SENNOSIDES 8.6 MG: 8.6 TABLET, FILM COATED ORAL at 12:13

## 2024-05-23 RX ADMIN — POLYETHYLENE GLYCOL 3350 17 G: 17 POWDER, FOR SOLUTION ORAL at 12:11

## 2024-05-23 RX ADMIN — METOPROLOL TARTRATE 25 MG: 25 TABLET, FILM COATED ORAL at 12:14

## 2024-05-23 RX ADMIN — KETOROLAC TROMETHAMINE 15 MG: 15 INJECTION, SOLUTION INTRAMUSCULAR; INTRAVENOUS at 13:08

## 2024-05-23 RX ADMIN — CLONAZEPAM 0.25 MG: 0.5 TABLET ORAL at 00:46

## 2024-05-23 RX ADMIN — ONDANSETRON HYDROCHLORIDE 4 MG: 2 INJECTION INTRAMUSCULAR; INTRAVENOUS at 15:42

## 2024-05-23 RX ADMIN — ZOLPIDEM TARTRATE 5 MG: 5 TABLET ORAL at 21:42

## 2024-05-23 RX ADMIN — ONDANSETRON HYDROCHLORIDE 4 MG: 2 INJECTION INTRAMUSCULAR; INTRAVENOUS at 23:41

## 2024-05-23 RX ADMIN — MORPHINE SULFATE 2 MG: 2 INJECTION, SOLUTION INTRAMUSCULAR; INTRAVENOUS at 05:34

## 2024-05-23 RX ADMIN — POLYETHYLENE GLYCOL 3350 17 G: 17 POWDER, FOR SOLUTION ORAL at 21:42

## 2024-05-23 RX ADMIN — MORPHINE SULFATE 1 MG: 2 INJECTION, SOLUTION INTRAMUSCULAR; INTRAVENOUS at 12:17

## 2024-05-23 RX ADMIN — ACYCLOVIR 400 MG: 200 CAPSULE ORAL at 21:41

## 2024-05-23 RX ADMIN — ONDANSETRON HYDROCHLORIDE 4 MG: 2 INJECTION INTRAMUSCULAR; INTRAVENOUS at 12:25

## 2024-05-23 RX ADMIN — AMLODIPINE BESYLATE 10 MG: 5 TABLET ORAL at 12:13

## 2024-05-23 RX ADMIN — Medication 12.5 MG: at 08:57

## 2024-05-23 RX ADMIN — HYDROXYZINE HYDROCHLORIDE 50 MG: 25 TABLET, FILM COATED ORAL at 21:43

## 2024-05-23 RX ADMIN — ASPIRIN 81 MG: 81 TABLET, CHEWABLE ORAL at 15:56

## 2024-05-23 ASSESSMENT — PAIN DESCRIPTION - DESCRIPTORS
DESCRIPTORS: ACHING
DESCRIPTORS: BURNING
DESCRIPTORS: ACHING
DESCRIPTORS: BURNING

## 2024-05-23 ASSESSMENT — PAIN SCALES - GENERAL
PAINLEVEL_OUTOF10: 7
PAINLEVEL_OUTOF10: 9
PAINLEVEL_OUTOF10: 0
PAINLEVEL_OUTOF10: 7

## 2024-05-23 ASSESSMENT — PAIN DESCRIPTION - LOCATION
LOCATION: FLANK
LOCATION: FLANK
LOCATION: BACK;CHEST
LOCATION: CHEST
LOCATION: ARM;CHEST

## 2024-05-23 ASSESSMENT — PAIN DESCRIPTION - ORIENTATION
ORIENTATION: LEFT;RIGHT
ORIENTATION: LEFT
ORIENTATION: ANTERIOR;POSTERIOR

## 2024-05-23 NOTE — PROGRESS NOTES
5/23/2024  PT treatment not completed due to:  [] Off Unit for testing/procedure  [] Pain  [] Eating  [] Patient too lethargic  [x] Nausea/vomiting  [] Dialysis treatment in progress   [x] Other: Pt R side lying in bed; spouse present in room and supportive.  Pt moaning, c/o nausea (has received medication to address same per nurse Sky).  Pt unable to participate with PT session at this time; note wrist splint in place until after 5:00 this afternoon post cath yesterday.  Will f/u as pt schedule allows.  Thank you.   TOÑITO Vasquez, PT

## 2024-05-23 NOTE — PROGRESS NOTES
Physician Progress Note      PATIENT:               GIGI RODRIGUEZ  CSN #:                  [id number]  :                       1960  ADMIT DATE:       2024 1:46 PM  DISCH DATE:  RESPONDING  PROVIDER #:        Lexis Johnson MD          QUERY TEXT:    Good afternoon  Pt admitted with atypical chest pain. Pt noted to have CAD. If possible,   please document in progress notes and discharge summary the relationship, if   any, between Atypical chest pain and CAD.    The medical record reflects the following:  Risk Factors: age, HTN, HLD, T2DM, chest pain, SOB, CODP  Clinical Indicators: Patient presented with left sided sharp chest pain that   radiates underneath left breast to left arm. Associated with worsening SOB  VS: 109, 24, 157/138, 97%  Cardiology consulted and cardiac work up completed  Lexiscan stress test- mild CAD, CTA- no PE, Echo-EF of 60 - 65%.  Treatment: daily labs, EKG, cardiology consult, stress test, LHC, Lidocaine   patch, Echo    Thank you  Chen Ventura RN [facility]  [phone]  Options provided:  -- Atypical chest pain due to CAD  -- Atypical chest pain due to, . Please document cause  -- Atypical chest pain unrelated to CAD  -- Other - I will add my own diagnosis  -- Disagree - Not applicable / Not valid  -- Disagree - Clinically unable to determine / Unknown  -- Refer to Clinical Documentation Reviewer    PROVIDER RESPONSE TEXT:    This patient has Atypical chest pain due to shingles    Query created by: Chen Ventura on 2024 12:40 PM      Electronically signed by:  Lexis Johnson MD 2024 2:01 PM

## 2024-05-23 NOTE — PROGRESS NOTES
see my pcp today , pain is better   Family member were at the bedside       TIME: E/M Time spent with patient and patient care issues: [] 31-40 mins  [] 41-49 mins  [x] 50 mins or more.      Disposition :1-2 days   Review of systems:    General: No fevers or chills.  Cardiovascular: + chest pain or pressure. No palpitations.   Pulmonary: No shortness of breath.   Gastrointestinal: +nausea, no vomiting. + constipation     Vital signs/Intake and Output:  Visit Vitals  BP (!) 133/92   Pulse 71   Temp 98.1 °F (36.7 °C) (Temporal)   Resp 22   Ht 1.651 m (5' 5\")   Wt 87 kg (191 lb 12.8 oz)   SpO2 98%   Breastfeeding No   BMI 31.92 kg/m²     Current Shift:  05/23 0701 - 05/23 1900  In: -   Out: 1000 [Urine:1000]  Last three shifts:  05/21 1901 - 05/23 0700  In: -   Out: 875 [Urine:850]        Physical Exam:  General: WD, WN.  Alert, cooperative, no acute distress    HEENT: NC, Atraumatic.  PERRLA, anicteric sclerae.  Lungs: CTA Bilaterally. No Wheezing/Rhonchi/Rales.  Heart:  Regular  rhythm,  No murmur, No Rubs, No Gallops  Abdomen: Soft, Non distended, Non tender.  +Bowel sounds,   Extremities: No c/c/e  Psych:   +anxious, no  agitated.  Neurologic:  No acute neurological deficit.     Derm ; blister belt shape rash on left chest and left upper back             Labs: Results:       Chemistry Recent Labs     05/22/24 0445      K 4.6      CO2 31   BUN 13   GLOB 2.5      CBC w/Diff Recent Labs     05/22/24 0445   WBC 7.1   RBC 5.30   HGB 11.6*   HCT 39.0         Cardiac Enzymes No results for input(s): \"CPK\" in the last 72 hours.    Invalid input(s): \"CKRMB\", \"CKND1\", \"TROIP\", \"TOMY\"   Coagulation Recent Labs     05/22/24 0445   INR 1.0       Lipid Panel Lab Results   Component Value Date/Time    CHOL 162 05/19/2024 12:37 AM    HDL 38 05/19/2024 12:37 AM    LDL 99.2 05/19/2024 12:37 AM    VLDL 24.8 05/19/2024 12:37 AM      BNP Invalid input(s): \"BNPP\"   Liver Enzymes No results for input(s): \"TP\" in the

## 2024-05-24 VITALS
RESPIRATION RATE: 18 BRPM | SYSTOLIC BLOOD PRESSURE: 147 MMHG | TEMPERATURE: 97.3 F | BODY MASS INDEX: 31.96 KG/M2 | HEIGHT: 65 IN | DIASTOLIC BLOOD PRESSURE: 75 MMHG | HEART RATE: 68 BPM | OXYGEN SATURATION: 97 % | WEIGHT: 191.8 LBS

## 2024-05-24 LAB
BACTERIA SPEC CULT: NORMAL
BACTERIA SPEC CULT: NORMAL
ECHO BSA: 2 M2
GLUCOSE BLD STRIP.AUTO-MCNC: 120 MG/DL (ref 70–110)
SERVICE CMNT-IMP: NORMAL
SERVICE CMNT-IMP: NORMAL

## 2024-05-24 PROCEDURE — 2580000003 HC RX 258: Performed by: INTERNAL MEDICINE

## 2024-05-24 PROCEDURE — 6370000000 HC RX 637 (ALT 250 FOR IP): Performed by: HOSPITALIST

## 2024-05-24 PROCEDURE — 6370000000 HC RX 637 (ALT 250 FOR IP): Performed by: FAMILY MEDICINE

## 2024-05-24 PROCEDURE — 6370000000 HC RX 637 (ALT 250 FOR IP): Performed by: INTERNAL MEDICINE

## 2024-05-24 PROCEDURE — 6360000002 HC RX W HCPCS: Performed by: HOSPITALIST

## 2024-05-24 PROCEDURE — 6360000002 HC RX W HCPCS: Performed by: INTERNAL MEDICINE

## 2024-05-24 PROCEDURE — 82962 GLUCOSE BLOOD TEST: CPT

## 2024-05-24 RX ORDER — ASPIRIN 81 MG/1
81 TABLET, CHEWABLE ORAL DAILY
Qty: 30 TABLET | Refills: 0 | Status: SHIPPED | OUTPATIENT
Start: 2024-05-25

## 2024-05-24 RX ORDER — ACYCLOVIR 200 MG/1
400 CAPSULE ORAL 4 TIMES DAILY
Qty: 72 CAPSULE | Refills: 0 | Status: SHIPPED | OUTPATIENT
Start: 2024-05-24 | End: 2024-06-02

## 2024-05-24 RX ORDER — AMLODIPINE BESYLATE 10 MG/1
10 TABLET ORAL DAILY
Qty: 30 TABLET | Refills: 0 | Status: SHIPPED | OUTPATIENT
Start: 2024-05-25

## 2024-05-24 RX ORDER — ATORVASTATIN CALCIUM 40 MG/1
40 TABLET, FILM COATED ORAL NIGHTLY
Qty: 30 TABLET | Refills: 0 | Status: SHIPPED | OUTPATIENT
Start: 2024-05-24

## 2024-05-24 RX ORDER — LIDOCAINE 4 G/G
1 PATCH TOPICAL DAILY
Qty: 10 EACH | Refills: 0 | Status: SHIPPED | OUTPATIENT
Start: 2024-05-25

## 2024-05-24 RX ORDER — LOSARTAN POTASSIUM 50 MG/1
50 TABLET ORAL DAILY
Qty: 30 TABLET | Refills: 0 | Status: SHIPPED | OUTPATIENT
Start: 2024-05-25

## 2024-05-24 RX ADMIN — ASPIRIN 81 MG: 81 TABLET, CHEWABLE ORAL at 08:34

## 2024-05-24 RX ADMIN — SODIUM CHLORIDE, PRESERVATIVE FREE 10 ML: 5 INJECTION INTRAVENOUS at 08:38

## 2024-05-24 RX ADMIN — METOPROLOL TARTRATE 25 MG: 25 TABLET, FILM COATED ORAL at 08:35

## 2024-05-24 RX ADMIN — LEVOTHYROXINE SODIUM 112 MCG: 0.07 TABLET ORAL at 06:25

## 2024-05-24 RX ADMIN — ENOXAPARIN SODIUM 40 MG: 100 INJECTION SUBCUTANEOUS at 08:36

## 2024-05-24 RX ADMIN — AMLODIPINE BESYLATE 10 MG: 5 TABLET ORAL at 08:34

## 2024-05-24 RX ADMIN — POLYETHYLENE GLYCOL 3350 17 G: 17 POWDER, FOR SOLUTION ORAL at 08:35

## 2024-05-24 RX ADMIN — LOSARTAN POTASSIUM 50 MG: 50 TABLET, FILM COATED ORAL at 08:35

## 2024-05-24 RX ADMIN — ONDANSETRON HYDROCHLORIDE 4 MG: 2 INJECTION INTRAMUSCULAR; INTRAVENOUS at 08:34

## 2024-05-24 RX ADMIN — KETOROLAC TROMETHAMINE 15 MG: 15 INJECTION, SOLUTION INTRAMUSCULAR; INTRAVENOUS at 08:34

## 2024-05-24 RX ADMIN — SODIUM CHLORIDE, PRESERVATIVE FREE 10 ML: 5 INJECTION INTRAVENOUS at 08:37

## 2024-05-24 RX ADMIN — CLONAZEPAM 0.25 MG: 0.5 TABLET ORAL at 01:28

## 2024-05-24 RX ADMIN — ACYCLOVIR 400 MG: 200 CAPSULE ORAL at 08:35

## 2024-05-24 RX ADMIN — MORPHINE SULFATE 1 MG: 2 INJECTION, SOLUTION INTRAMUSCULAR; INTRAVENOUS at 03:02

## 2024-05-24 RX ADMIN — SENNOSIDES 8.6 MG: 8.6 TABLET, FILM COATED ORAL at 08:35

## 2024-05-24 ASSESSMENT — PAIN DESCRIPTION - DESCRIPTORS: DESCRIPTORS: BURNING

## 2024-05-24 ASSESSMENT — PAIN SCALES - GENERAL
PAINLEVEL_OUTOF10: 0
PAINLEVEL_OUTOF10: 7
PAINLEVEL_OUTOF10: 6
PAINLEVEL_OUTOF10: 0

## 2024-05-24 ASSESSMENT — PAIN DESCRIPTION - ORIENTATION: ORIENTATION: UPPER

## 2024-05-24 ASSESSMENT — PAIN DESCRIPTION - FREQUENCY: FREQUENCY: INTERMITTENT

## 2024-05-24 ASSESSMENT — PAIN DESCRIPTION - LOCATION
LOCATION: GENERALIZED
LOCATION: GENERALIZED

## 2024-05-24 ASSESSMENT — PAIN DESCRIPTION - DIRECTION: RADIATING_TOWARDS: NOT RADIATING

## 2024-05-24 ASSESSMENT — PAIN DESCRIPTION - ONSET: ONSET: ON-GOING

## 2024-05-24 ASSESSMENT — PAIN - FUNCTIONAL ASSESSMENT: PAIN_FUNCTIONAL_ASSESSMENT: ACTIVITIES ARE NOT PREVENTED

## 2024-05-24 ASSESSMENT — PAIN DESCRIPTION - PAIN TYPE: TYPE: NEUROPATHIC PAIN

## 2024-05-24 ASSESSMENT — PAIN SCALES - WONG BAKER: WONGBAKER_NUMERICALRESPONSE: NO HURT

## 2024-05-24 NOTE — PLAN OF CARE
Problem: Discharge Planning  Goal: Discharge to home or other facility with appropriate resources  5/24/2024 1015 by Mark Anthony Becerra RN  Outcome: Progressing  5/23/2024 2139 by Celia Leonard, RN  Outcome: Progressing     Problem: Safety - Adult  Goal: Free from fall injury  5/24/2024 1015 by Mark Anthony Becerra RN  Outcome: Progressing  5/23/2024 2139 by Celia Leonard, RN  Outcome: Progressing     Problem: Pain  Goal: Verbalizes/displays adequate comfort level or baseline comfort level  5/24/2024 1015 by Mark Anthony Becerra RN  Outcome: Progressing  5/23/2024 2139 by Celia Leonard, RN  Outcome: Progressing     Problem: Chronic Conditions and Co-morbidities  Goal: Patient's chronic conditions and co-morbidity symptoms are monitored and maintained or improved  5/24/2024 1015 by Mark Anthony Becerra RN  Outcome: Progressing  5/23/2024 2139 by Celia Leonard, RN  Outcome: Progressing  Flowsheets (Taken 5/23/2024 1925)  Care Plan - Patient's Chronic Conditions and Co-Morbidity Symptoms are Monitored and Maintained or Improved:   Monitor and assess patient's chronic conditions and comorbid symptoms for stability, deterioration, or improvement   Collaborate with multidisciplinary team to address chronic and comorbid conditions and prevent exacerbation or deterioration   Update acute care plan with appropriate goals if chronic or comorbid symptoms are exacerbated and prevent overall improvement and discharge

## 2024-05-24 NOTE — DISCHARGE SUMMARY
Wheezing/Rhonchi/Rales.  Heart:  Regular  rhythm,  No murmur, No Rubs, No Gallops  Abdomen: Soft, Non distended, Non tender. +Bowel sounds,   Extremities: No c/c/e  Psych:   Not anxious or agitated.  Neurologic:  No acute neurological deficits.                                     Admission HPI :   64-year-old female with multiple cardiac risk factors including type 2 diabetes, COPD, chronic hypoxic respiratory failure, hepatitis C, multiple sclerosis, hypertension and hyperlipidemia here with chest pain.  Chest pain primarily left-sided.  Started about 3 days ago.  It was not better or worse with anything.  Episodes would come and go.  She thinks the pain may have radiated to the left arm.  Evaluation in the emergency department unrevealing.  CT angiogram of the chest negative for pulmonary embolism.  EKG without acute abnormalities.  Troponin normal.  She has never had risk stratification.  Cardiology was consulted by the ER physician.  Cardiology recommending observation, echocardiogram and stress test on Monday.     Hospital Course :     Atypical chest pain. Resolved   Cardiologist consulted and stress test negative. Cath done due to continue chest pain. No intervention needed.   Continue Aspirin 81 mg daily. And Metoprolol 25 mg p.o. twice daily.  -Cta chest no PE , trop wnl      Shingles -acyclovir , symptoms control      Constipation   Miralax and senokot    COPD.  -Stable.  -Continue bronchodilators and supplemental oxygen.       Chronic hypoxic respiratory failure.  -Continue customary supplemental oxygen.     Hypertension uncontrolled.   -Initiate metoprolol.increase norvasc   -Monitor blood pressure.  May need more control than this.  -Low-sodium heart healthy diet.      Type 2 diabetes.  -Diabetes surveillance.  -Humalog sliding scale.  -Hemoglobin A1c     Hyperlipidemia.  -Assess lipid status     History of hepatitis C.  -History noted     History of multiple sclerosis.  -History noted.     Constipation  Trileaflet valve.   Mitral Valve: Mildly thickened leaflet, at the anterior leaflet.   Aorta: Normal sized ascending aorta. Mildly dilated aortic root. Ao root diameter is 3.9 cm.   Image quality is good.     CTA CHEST W WO CONTRAST    Result Date: 5/18/2024  EXAM:  CTA CHEST W WO CONTRAST INDICATION: Chest pain shortness of breath history of aneurysm. COMPARISON: None TECHNIQUE: Helical thin section chest CT following intravenous administration of nonionic contrast 100 mL of isovue 370 according to departmental PE protocol. Coronal and sagittal reformats were performed. 3D post processing was performed.  CT dose reduction was achieved through the use of a standardized protocol tailored for this examination and automatic exposure control for dose modulation. FINDINGS: This is a good quality study for the evaluation of pulmonary embolism to the first subsegmental arterial level. There is no pulmonary embolism to this level. No axillary or supraclavicular adenopathy. THYROID: No nodule. MEDIASTINUM: No mass or lymphadenopathy. CAITLIN: No mass or lymphadenopathy. THORACIC AORTA: No aneurysm. HEART: Normal in size. ESOPHAGUS: No wall thickening or dilatation. TRACHEA/BRONCHI: Patent. PLEURA: No effusion or pneumothorax. LUNGS: Bibasilar airspace opacities likely atelectasis. Emphysema UPPER ABDOMEN: Cyst in the left hepatic lobe BONES: No aggressive bone lesion or fracture.     No evidence of pulmonary embolus. No acute cardiopulmonary process. No evidence of aneurysm or dissection. Background of emphysema and bibasilar atelectasis     XR CHEST PORTABLE    Result Date: 5/18/2024  INDICATION: Chest pain. Portable AP view of the chest. Direct comparison made to prior chest x-ray dated September 2016. Cardiomediastinal silhouette is stable. Lungs are clear bilaterally. Pleural spaces are normal and there is no pneumothorax. Osseous structures are intact.     No acute cardiopulmonary disease.             VITOR SIMON,Internal

## 2024-05-24 NOTE — PLAN OF CARE
Problem: Discharge Planning  Goal: Discharge to home or other facility with appropriate resources  5/23/2024 2139 by Celia Leonard, RN  Outcome: Progressing  5/23/2024 1944 by Asya Paniagua RN  Outcome: Progressing     Problem: Safety - Adult  Goal: Free from fall injury  5/23/2024 2139 by Celia Leonard, RN  Outcome: Progressing  5/23/2024 1944 by Asya Paniagua RN  Outcome: Progressing     Problem: Pain  Goal: Verbalizes/displays adequate comfort level or baseline comfort level  Outcome: Progressing     Problem: Chronic Conditions and Co-morbidities  Goal: Patient's chronic conditions and co-morbidity symptoms are monitored and maintained or improved  Outcome: Progressing

## (undated) DEVICE — STOPCOCK TRNSDUC 500PSI 3 W ROT M LUER LT BLU OFF HNDL R

## (undated) DEVICE — PACK PROCEDURE SURG CATH CUST

## (undated) DEVICE — CATHETER DIAG 5FR L100CM LUMN ID0.047IN JL3.5 CRV 0 SIDE H

## (undated) DEVICE — DRAPE,ANGIO,BRACH,STERILE,38X44: Brand: MEDLINE

## (undated) DEVICE — ELECTRODES QUIK COMBO CONN RTS DEFIB

## (undated) DEVICE — BAND COMPR L24CM REG CLR PLAS HEMSTAT EXT HK AND LOOP RETEN

## (undated) DEVICE — GLIDESHEATH SLENDER STAINLESS STEEL KIT: Brand: GLIDESHEATH SLENDER

## (undated) DEVICE — SPLINT WR VELC FOAM NEUT POS DISP FOR RAD ART ACC SFT STRP

## (undated) DEVICE — DRAPE EP LT SUBCLAV ENTRY SHLD SORBX

## (undated) DEVICE — CATHETER 5FR JR3.5 MEDTRONIC 100CM

## (undated) DEVICE — SENSOR PLSE OXMTR AD CBL L36IN ADH FRM FIT SPO2 DISP